# Patient Record
Sex: FEMALE | Race: WHITE | NOT HISPANIC OR LATINO | Employment: OTHER | URBAN - METROPOLITAN AREA
[De-identification: names, ages, dates, MRNs, and addresses within clinical notes are randomized per-mention and may not be internally consistent; named-entity substitution may affect disease eponyms.]

---

## 2023-04-26 LAB
ALANINE AMINOTRANSFERASE (SGPT) (U/L) IN SER/PLAS: 17 U/L (ref 7–45)
ALBUMIN (G/DL) IN SER/PLAS: 4.4 G/DL (ref 3.4–5)
ALKALINE PHOSPHATASE (U/L) IN SER/PLAS: 64 U/L (ref 33–136)
ANION GAP IN SER/PLAS: 11 MMOL/L (ref 10–20)
ASPARTATE AMINOTRANSFERASE (SGOT) (U/L) IN SER/PLAS: 17 U/L (ref 9–39)
BASOPHILS (10*3/UL) IN BLOOD BY AUTOMATED COUNT: 0.04 X10E9/L (ref 0–0.1)
BASOPHILS/100 LEUKOCYTES IN BLOOD BY AUTOMATED COUNT: 0.7 % (ref 0–2)
BILIRUBIN TOTAL (MG/DL) IN SER/PLAS: 0.4 MG/DL (ref 0–1.2)
C REACTIVE PROTEIN (MG/L) IN SER/PLAS: 0.43 MG/DL
CALCIUM (MG/DL) IN SER/PLAS: 9.6 MG/DL (ref 8.6–10.6)
CARBON DIOXIDE, TOTAL (MMOL/L) IN SER/PLAS: 29 MMOL/L (ref 21–32)
CHLORIDE (MMOL/L) IN SER/PLAS: 103 MMOL/L (ref 98–107)
CREATININE (MG/DL) IN SER/PLAS: 0.8 MG/DL (ref 0.5–1.05)
EOSINOPHILS (10*3/UL) IN BLOOD BY AUTOMATED COUNT: 0.1 X10E9/L (ref 0–0.4)
EOSINOPHILS/100 LEUKOCYTES IN BLOOD BY AUTOMATED COUNT: 1.8 % (ref 0–6)
ERYTHROCYTE DISTRIBUTION WIDTH (RATIO) BY AUTOMATED COUNT: 12.2 % (ref 11.5–14.5)
ERYTHROCYTE MEAN CORPUSCULAR HEMOGLOBIN CONCENTRATION (G/DL) BY AUTOMATED: 32.4 G/DL (ref 32–36)
ERYTHROCYTE MEAN CORPUSCULAR VOLUME (FL) BY AUTOMATED COUNT: 96 FL (ref 80–100)
ERYTHROCYTES (10*6/UL) IN BLOOD BY AUTOMATED COUNT: 4.4 X10E12/L (ref 4–5.2)
GFR FEMALE: 78 ML/MIN/1.73M2
GLUCOSE (MG/DL) IN SER/PLAS: 90 MG/DL (ref 74–99)
HEMATOCRIT (%) IN BLOOD BY AUTOMATED COUNT: 42.3 % (ref 36–46)
HEMOGLOBIN (G/DL) IN BLOOD: 13.7 G/DL (ref 12–16)
IMMATURE GRANULOCYTES/100 LEUKOCYTES IN BLOOD BY AUTOMATED COUNT: 0.2 % (ref 0–0.9)
LEUKOCYTES (10*3/UL) IN BLOOD BY AUTOMATED COUNT: 5.4 X10E9/L (ref 4.4–11.3)
LYMPHOCYTES (10*3/UL) IN BLOOD BY AUTOMATED COUNT: 2.13 X10E9/L (ref 0.8–3)
LYMPHOCYTES/100 LEUKOCYTES IN BLOOD BY AUTOMATED COUNT: 39.3 % (ref 13–44)
MONOCYTES (10*3/UL) IN BLOOD BY AUTOMATED COUNT: 0.48 X10E9/L (ref 0.05–0.8)
MONOCYTES/100 LEUKOCYTES IN BLOOD BY AUTOMATED COUNT: 8.9 % (ref 2–10)
NEUTROPHILS (10*3/UL) IN BLOOD BY AUTOMATED COUNT: 2.66 X10E9/L (ref 1.6–5.5)
NEUTROPHILS/100 LEUKOCYTES IN BLOOD BY AUTOMATED COUNT: 49.1 % (ref 40–80)
NRBC (PER 100 WBCS) BY AUTOMATED COUNT: 0 /100 WBC (ref 0–0)
PLATELETS (10*3/UL) IN BLOOD AUTOMATED COUNT: 294 X10E9/L (ref 150–450)
POTASSIUM (MMOL/L) IN SER/PLAS: 4.2 MMOL/L (ref 3.5–5.3)
PROTEIN TOTAL: 6.9 G/DL (ref 6.4–8.2)
SEDIMENTATION RATE, ERYTHROCYTE: 9 MM/H (ref 0–30)
SODIUM (MMOL/L) IN SER/PLAS: 139 MMOL/L (ref 136–145)
THYROTROPIN (MIU/L) IN SER/PLAS BY DETECTION LIMIT <= 0.05 MIU/L: 0.79 MIU/L (ref 0.44–3.98)
UREA NITROGEN (MG/DL) IN SER/PLAS: 18 MG/DL (ref 6–23)

## 2023-10-11 ENCOUNTER — TELEPHONE (OUTPATIENT)
Dept: PRIMARY CARE | Facility: CLINIC | Age: 73
End: 2023-10-11
Payer: COMMERCIAL

## 2023-10-11 DIAGNOSIS — M35.05 SJOGREN'S SYNDROME WITH INFLAMMATORY ARTHRITIS (MULTI): Primary | ICD-10-CM

## 2023-10-11 PROBLEM — E78.2 HYPERLIPIDEMIA, MIXED: Status: ACTIVE | Noted: 2023-10-11

## 2023-10-11 PROBLEM — M79.7 FIBROMYALGIA: Status: ACTIVE | Noted: 2023-10-11

## 2023-10-11 PROBLEM — M35.01 SJOGREN'S SYNDROME WITH KERATOCONJUNCTIVITIS SICCA (MULTI): Status: ACTIVE | Noted: 2023-10-11

## 2023-10-11 PROBLEM — F51.01 PRIMARY INSOMNIA: Status: ACTIVE | Noted: 2023-10-11

## 2023-10-11 PROBLEM — I10 HYPERTENSION, UNCONTROLLED: Status: ACTIVE | Noted: 2023-10-11

## 2023-10-11 PROBLEM — M21.959: Status: ACTIVE | Noted: 2023-10-11

## 2023-10-11 PROBLEM — F33.42 RECURRENT MAJOR DEPRESSION IN FULL REMISSION (CMS-HCC): Status: ACTIVE | Noted: 2023-10-11

## 2023-10-11 PROBLEM — K21.9 GERD (GASTROESOPHAGEAL REFLUX DISEASE): Status: ACTIVE | Noted: 2023-10-11

## 2023-10-11 PROBLEM — J31.0 CHRONIC SIMPLE RHINITIS: Status: ACTIVE | Noted: 2023-10-11

## 2023-10-11 RX ORDER — EZETIMIBE 10 MG/1
1 TABLET ORAL DAILY
COMMUNITY
Start: 2021-10-22 | End: 2024-03-11

## 2023-10-11 RX ORDER — FLUOXETINE HYDROCHLORIDE 20 MG/1
1 CAPSULE ORAL DAILY
COMMUNITY
Start: 2015-11-10 | End: 2024-03-11

## 2023-10-11 RX ORDER — LISINOPRIL 40 MG/1
1 TABLET ORAL DAILY
COMMUNITY
Start: 2019-05-01 | End: 2024-03-11

## 2023-10-11 RX ORDER — CHOLECALCIFEROL (VITAMIN D3) 50 MCG
TABLET ORAL
COMMUNITY
Start: 2021-03-24

## 2023-10-11 RX ORDER — AMLODIPINE BESYLATE 5 MG/1
1 TABLET ORAL DAILY
COMMUNITY
Start: 2022-04-20

## 2023-10-11 NOTE — PROGRESS NOTES
Subjective   Patient ID: Amelie Burris is a 73 y.o. female who presents for No chief complaint on file..  HPI    Review of Systems    Objective   Physical Exam    Assessment/Plan

## 2023-10-12 DIAGNOSIS — E78.2 HYPERLIPIDEMIA, MIXED: Primary | ICD-10-CM

## 2023-10-19 ENCOUNTER — LAB (OUTPATIENT)
Dept: LAB | Facility: LAB | Age: 73
End: 2023-10-19
Payer: COMMERCIAL

## 2023-10-19 DIAGNOSIS — M35.05 SJOGREN'S SYNDROME WITH INFLAMMATORY ARTHRITIS (MULTI): ICD-10-CM

## 2023-10-19 DIAGNOSIS — E78.2 HYPERLIPIDEMIA, MIXED: ICD-10-CM

## 2023-10-19 LAB
ALBUMIN SERPL BCP-MCNC: 4.5 G/DL (ref 3.4–5)
ALP SERPL-CCNC: 70 U/L (ref 33–136)
ALT SERPL W P-5'-P-CCNC: 20 U/L (ref 7–45)
ANION GAP SERPL CALC-SCNC: 14 MMOL/L (ref 10–20)
AST SERPL W P-5'-P-CCNC: 19 U/L (ref 9–39)
BASOPHILS # BLD AUTO: 0.04 X10*3/UL (ref 0–0.1)
BASOPHILS NFR BLD AUTO: 0.6 %
BILIRUB SERPL-MCNC: 0.4 MG/DL (ref 0–1.2)
BUN SERPL-MCNC: 14 MG/DL (ref 6–23)
CALCIUM SERPL-MCNC: 9.3 MG/DL (ref 8.6–10.6)
CHLORIDE SERPL-SCNC: 105 MMOL/L (ref 98–107)
CHOLEST SERPL-MCNC: 262 MG/DL (ref 0–199)
CHOLESTEROL/HDL RATIO: 6.9
CO2 SERPL-SCNC: 27 MMOL/L (ref 21–32)
CREAT SERPL-MCNC: 0.83 MG/DL (ref 0.5–1.05)
CRP SERPL-MCNC: 0.33 MG/DL
EOSINOPHIL # BLD AUTO: 0.17 X10*3/UL (ref 0–0.4)
EOSINOPHIL NFR BLD AUTO: 2.7 %
ERYTHROCYTE [DISTWIDTH] IN BLOOD BY AUTOMATED COUNT: 11.9 % (ref 11.5–14.5)
ERYTHROCYTE [SEDIMENTATION RATE] IN BLOOD BY WESTERGREN METHOD: 9 MM/H (ref 0–30)
GFR SERPL CREATININE-BSD FRML MDRD: 75 ML/MIN/1.73M*2
GLUCOSE SERPL-MCNC: 91 MG/DL (ref 74–99)
HCT VFR BLD AUTO: 41.4 % (ref 36–46)
HDLC SERPL-MCNC: 38 MG/DL
HGB BLD-MCNC: 13.8 G/DL (ref 12–16)
IMM GRANULOCYTES # BLD AUTO: 0.03 X10*3/UL (ref 0–0.5)
IMM GRANULOCYTES NFR BLD AUTO: 0.5 % (ref 0–0.9)
LDLC SERPL CALC-MCNC: 185 MG/DL
LYMPHOCYTES # BLD AUTO: 3.18 X10*3/UL (ref 0.8–3)
LYMPHOCYTES NFR BLD AUTO: 49.8 %
MCH RBC QN AUTO: 31.7 PG (ref 26–34)
MCHC RBC AUTO-ENTMCNC: 33.3 G/DL (ref 32–36)
MCV RBC AUTO: 95 FL (ref 80–100)
MONOCYTES # BLD AUTO: 0.56 X10*3/UL (ref 0.05–0.8)
MONOCYTES NFR BLD AUTO: 8.8 %
NEUTROPHILS # BLD AUTO: 2.41 X10*3/UL (ref 1.6–5.5)
NEUTROPHILS NFR BLD AUTO: 37.6 %
NON HDL CHOLESTEROL: 224 MG/DL (ref 0–149)
NRBC BLD-RTO: 0 /100 WBCS (ref 0–0)
PLATELET # BLD AUTO: 309 X10*3/UL (ref 150–450)
PMV BLD AUTO: 9.7 FL (ref 7.5–11.5)
POTASSIUM SERPL-SCNC: 4 MMOL/L (ref 3.5–5.3)
PROT SERPL-MCNC: 6.8 G/DL (ref 6.4–8.2)
RBC # BLD AUTO: 4.36 X10*6/UL (ref 4–5.2)
SODIUM SERPL-SCNC: 142 MMOL/L (ref 136–145)
TRIGL SERPL-MCNC: 195 MG/DL (ref 0–149)
VLDL: 39 MG/DL (ref 0–40)
WBC # BLD AUTO: 6.4 X10*3/UL (ref 4.4–11.3)

## 2023-10-19 PROCEDURE — 80053 COMPREHEN METABOLIC PANEL: CPT

## 2023-10-19 PROCEDURE — 85025 COMPLETE CBC W/AUTO DIFF WBC: CPT

## 2023-10-19 PROCEDURE — 85652 RBC SED RATE AUTOMATED: CPT

## 2023-10-19 PROCEDURE — 86140 C-REACTIVE PROTEIN: CPT

## 2023-10-19 PROCEDURE — 80061 LIPID PANEL: CPT

## 2023-10-19 PROCEDURE — 86038 ANTINUCLEAR ANTIBODIES: CPT

## 2023-10-19 PROCEDURE — 36415 COLL VENOUS BLD VENIPUNCTURE: CPT

## 2023-10-20 LAB — ANA SER QL HEP2 SUBST: NEGATIVE

## 2023-10-23 PROBLEM — H25.12 SENILE NUCLEAR CATARACT, LEFT: Status: ACTIVE | Noted: 2018-01-03

## 2023-10-23 PROBLEM — K52.9 CHRONIC DIARRHEA: Status: ACTIVE | Noted: 2023-10-23

## 2023-10-23 PROBLEM — Z90.13 HISTORY OF BILATERAL MASTECTOMY: Status: ACTIVE | Noted: 2023-10-23

## 2023-10-23 NOTE — PROGRESS NOTES
Subjective   Patient ID: Amelie Burris is a 73 y.o. female who presents for Sjogren's Syndrome.  HPI pt  notes increased fatigue.  Not sleeping well and OTC meds not really working.   Having itching and some dryness  Pt also notes a breast lump---she has had bilateral mastectomy  Pt is worried  Patient Active Problem List    Diagnosis Date Noted    History of bilateral mastectomy 10/23/2023    Chronic diarrhea 10/23/2023    Chondral defect of acetabulum 10/11/2023    Chronic simple rhinitis 10/11/2023    Fibromyalgia 10/11/2023    GERD (gastroesophageal reflux disease) 10/11/2023    Hyperlipidemia, mixed 10/11/2023    Hypertension, uncontrolled 10/11/2023    Primary insomnia 10/11/2023    Recurrent major depression in full remission (CMS/[facility], located [facility]) 10/11/2023    Sjogren's syndrome with inflammatory arthritis (CMS/[facility]) 10/11/2023    Senile nuclear cataract, left 01/03/2018    Fuchs' corneal dystrophy 06/04/2012     Past Medical History:   Diagnosis Date    Acne 10/24/2014    History of acne    Breast cancer (CMS/[facility]) 04/13/2020    History of breast cancer    Chronic maxillary sinusitis 11/09/2017    Right maxillary sinusitis    Drug rash 08/27/2015    History of drug rash    Dry mouth, unspecified 06/29/2015    Dry mouth    Facial dermatitis 10/11/2017    Facial dermatitis    Fuchs' corneal dystrophy 10/24/2014    Fuchs' corneal dystrophy    Nontoxic multinodular goiter     Multinodular goiter    Other long term (current) drug therapy 06/08/2016    Long-term use of Plaquenil    Plantar fascial fibromatosis 03/24/2021    Plantar fasciitis    Sensorineural hearing loss, bilateral     Sensorineural hearing loss (SNHL) of both ears    Thyroid nodule 06/08/2016    History of thyroid nodule    Tinnitus 10/24/2014    History of tinnitus    Unspecified cataract     Cataracts, bilateral     Current Outpatient Medications   Medication Instructions    amLODIPine (Norvasc) 5 mg tablet 1 tablet, oral, Daily    cholecalciferol  "(Vitamin D-3) 50 MCG (2000 UT) tablet oral    ezetimibe (Zetia) 10 mg tablet 1 tablet, oral, Daily    FLUoxetine (PROzac) 20 mg capsule 1 capsule, oral, Daily    lisinopril 40 mg tablet 1 tablet, oral, Daily    ramelteon (ROZEREM) 8 mg, oral, Nightly    white petrolatum-mineral oiL (Tears Naturale PM) 94-3 % ophthalmic ointment 1 Application, Both Eyes     Allergies   Allergen Reactions    Amoxicillin Nausea/vomiting       Review of Systems   Constitutional:  Positive for fatigue. Negative for fever and unexpected weight change.   HENT:  Negative for congestion.         Dry mouth   Respiratory:  Negative for cough and shortness of breath.    Cardiovascular:  Negative for leg swelling.   Musculoskeletal:  Positive for arthralgias. Negative for back pain.   Skin:  Negative for color change and rash.        +itching   Neurological:  Negative for dizziness, weakness, numbness and headaches.   Hematological:  Does not bruise/bleed easily.   Psychiatric/Behavioral:  Positive for sleep disturbance.        Objective  /86   Pulse 66   Temp 36.1 °C (97 °F)   Ht 1.664 m (5' 5.5\")   Wt 75 kg (165 lb 6.4 oz)   BMI 27.11 kg/m²     Physical Exam  Vitals reviewed.   Constitutional:       General: She is not in acute distress.     Appearance: Normal appearance.      Comments: Looks tired   HENT:      Head: Normocephalic and atraumatic.   Eyes:      Conjunctiva/sclera: Conjunctivae normal.   Pulmonary:      Effort: Pulmonary effort is normal.   Chest:          Comments: Small oblong hard nodule in upper inner quadrant.  About 2 centimeters lateral to a biopsy scar.     Pt s/p b mastectomy  Musculoskeletal:         General: No swelling, tenderness or deformity.      Right lower leg: No edema.      Left lower leg: No edema.   Skin:     Findings: No erythema or rash.   Neurological:      General: No focal deficit present.      Mental Status: She is alert.   Psychiatric:         Mood and Affect: Mood normal.       Lab on " 10/19/2023   Component Date Value Ref Range Status    WBC 10/19/2023 6.4  4.4 - 11.3 x10*3/uL Final    nRBC 10/19/2023 0.0  0.0 - 0.0 /100 WBCs Final    RBC 10/19/2023 4.36  4.00 - 5.20 x10*6/uL Final    Hemoglobin 10/19/2023 13.8  12.0 - 16.0 g/dL Final    Hematocrit 10/19/2023 41.4  36.0 - 46.0 % Final    MCV 10/19/2023 95  80 - 100 fL Final    MCH 10/19/2023 31.7  26.0 - 34.0 pg Final    MCHC 10/19/2023 33.3  32.0 - 36.0 g/dL Final    RDW 10/19/2023 11.9  11.5 - 14.5 % Final    Platelets 10/19/2023 309  150 - 450 x10*3/uL Final    MPV 10/19/2023 9.7  7.5 - 11.5 fL Final    Neutrophils % 10/19/2023 37.6  40.0 - 80.0 % Final    Immature Granulocytes %, Automated 10/19/2023 0.5  0.0 - 0.9 % Final    Immature Granulocyte Count (IG) includes promyelocytes, myelocytes and metamyelocytes but does not include bands. Percent differential counts (%) should be interpreted in the context of the absolute cell counts (cells/UL).    Lymphocytes % 10/19/2023 49.8  13.0 - 44.0 % Final    Monocytes % 10/19/2023 8.8  2.0 - 10.0 % Final    Eosinophils % 10/19/2023 2.7  0.0 - 6.0 % Final    Basophils % 10/19/2023 0.6  0.0 - 2.0 % Final    Neutrophils Absolute 10/19/2023 2.41  1.60 - 5.50 x10*3/uL Final    Percent differential counts (%) should be interpreted in the context of the absolute cell counts (cells/uL).    Immature Granulocytes Absolute, Au* 10/19/2023 0.03  0.00 - 0.50 x10*3/uL Final    Lymphocytes Absolute 10/19/2023 3.18 (H)  0.80 - 3.00 x10*3/uL Final    Monocytes Absolute 10/19/2023 0.56  0.05 - 0.80 x10*3/uL Final    Eosinophils Absolute 10/19/2023 0.17  0.00 - 0.40 x10*3/uL Final    Basophils Absolute 10/19/2023 0.04  0.00 - 0.10 x10*3/uL Final    Glucose 10/19/2023 91  74 - 99 mg/dL Final    Sodium 10/19/2023 142  136 - 145 mmol/L Final    Potassium 10/19/2023 4.0  3.5 - 5.3 mmol/L Final    Chloride 10/19/2023 105  98 - 107 mmol/L Final    Bicarbonate 10/19/2023 27  21 - 32 mmol/L Final    Anion Gap 10/19/2023 14  10  - 20 mmol/L Final    Urea Nitrogen 10/19/2023 14  6 - 23 mg/dL Final    Creatinine 10/19/2023 0.83  0.50 - 1.05 mg/dL Final    eGFR 10/19/2023 75  >60 mL/min/1.73m*2 Final    Calculations of estimated GFR are performed using the 2021 CKD-EPI Study Refit equation without the race variable for the IDMS-Traceable creatinine methods.  https://jasn.asnjournals.org/content/early/2021/09/22/ASN.6237424707    Calcium 10/19/2023 9.3  8.6 - 10.6 mg/dL Final    Albumin 10/19/2023 4.5  3.4 - 5.0 g/dL Final    Alkaline Phosphatase 10/19/2023 70  33 - 136 U/L Final    Total Protein 10/19/2023 6.8  6.4 - 8.2 g/dL Final    AST 10/19/2023 19  9 - 39 U/L Final    Bilirubin, Total 10/19/2023 0.4  0.0 - 1.2 mg/dL Final    ALT 10/19/2023 20  7 - 45 U/L Final    Patients treated with Sulfasalazine may generate falsely decreased results for ALT.    C-Reactive Protein 10/19/2023 0.33  <1.00 mg/dL Final    Sedimentation Rate 10/19/2023 9  0 - 30 mm/h Final    DMITRIY 10/19/2023 Negative  Negative Final    The Antinuclear Antibody (DMITRIY) test was performed using  indirect immunofluorescence assay with HEp-2 cells slide.    Cholesterol 10/19/2023 262 (H)  0 - 199 mg/dL Final          Age      Desirable   Borderline High   High     0-19 Y     0 - 169       170 - 199     >/= 200    20-24 Y     0 - 189       190 - 224     >/= 225         >24 Y     0 - 199       200 - 239     >/= 240   **All ranges are based on fasting samples. Specific   therapeutic targets will vary based on patient-specific   cardiac risk.    Pediatric guidelines reference:Pediatrics 2011, 128(S5).Adult guidelines reference: NCEP ATPIII Guidelines,SAAD 2001, 258:2486-97    Venipuncture immediately after or during the administration of Metamizole may lead to falsely low results. Testing should be performed immediately prior to Metamizole dosing.    HDL-Cholesterol 10/19/2023 38.0  mg/dL Final      Age       Very Low   Low     Normal    High    0-19 Y    < 35      < 40     40-45      ----  20-24 Y    ----     < 40      >45      ----        >24 Y      ----     < 40     40-60      >60      Cholesterol/HDL Ratio 10/19/2023 6.9   Final      Ref Values  Desirable  < 3.4  High Risk  > 5.0    LDL Calculated 10/19/2023 185 (H)  <=99 mg/dL Final                                Near   Borderline      AGE      Desirable  Optimal    High     High     Very High     0-19 Y     0 - 109     ---    110-129   >/= 130     ----    20-24 Y     0 - 119     ---    120-159   >/= 160     ----      >24 Y     0 -  99   100-129  130-159   160-189     >/=190      VLDL 10/19/2023 39  0 - 40 mg/dL Final    Triglycerides 10/19/2023 195 (H)  0 - 149 mg/dL Final       Age         Desirable   Borderline High   High     Very High   0 D-90 D    19 - 174         ----         ----        ----  91 D- 9 Y     0 -  74        75 -  99     >/= 100      ----    10-19 Y     0 -  89        90 - 129     >/= 130      ----    20-24 Y     0 - 114       115 - 149     >/= 150      ----         >24 Y     0 - 149       150 - 199    200- 499    >/= 500    Venipuncture immediately after or during the administration of Metamizole may lead to falsely low results. Testing should be performed immediately prior to Metamizole dosing.    Non HDL Cholesterol 10/19/2023 224 (H)  0 - 149 mg/dL Final          Age       Desirable   Borderline High   High     Very High     0-19 Y     0 - 119       120 - 144     >/= 145    >/= 160    20-24 Y     0 - 149       150 - 189     >/= 190      ----         >24 Y    30 mg/dL above LDL Cholesterol goal           Assessment/Plan   Problem List Items Addressed This Visit             ICD-10-CM    Fibromyalgia M79.7    Primary insomnia F51.01     Most sleep aids will worsen dryness.  Will try rozerem         Relevant Medications    ramelteon (Rozerem) 8 mg tablet    Sjogren's syndrome with inflammatory arthritis (CMS/HCC) - Primary M35.05     Sjogren's with dryness.  Pt's labs are stable and reviewed with patient.             History of bilateral mastectomy Z90.13     +nodule noted.   May be a surgical clip but recommend ultrasound to evaluate further          Other Visit Diagnoses         Codes    History of breast cancer     Z85.3    Relevant Orders    BI US breast complete left

## 2023-10-25 ENCOUNTER — OFFICE VISIT (OUTPATIENT)
Dept: RHEUMATOLOGY | Facility: CLINIC | Age: 73
End: 2023-10-25
Payer: COMMERCIAL

## 2023-10-25 ENCOUNTER — OFFICE VISIT (OUTPATIENT)
Dept: PRIMARY CARE | Facility: CLINIC | Age: 73
End: 2023-10-25
Payer: COMMERCIAL

## 2023-10-25 ENCOUNTER — TELEPHONE (OUTPATIENT)
Dept: PRIMARY CARE | Facility: CLINIC | Age: 73
End: 2023-10-25

## 2023-10-25 VITALS
TEMPERATURE: 97 F | WEIGHT: 165 LBS | SYSTOLIC BLOOD PRESSURE: 167 MMHG | HEART RATE: 63 BPM | DIASTOLIC BLOOD PRESSURE: 86 MMHG | HEIGHT: 66 IN | OXYGEN SATURATION: 92 % | BODY MASS INDEX: 26.52 KG/M2

## 2023-10-25 VITALS
SYSTOLIC BLOOD PRESSURE: 167 MMHG | BODY MASS INDEX: 26.58 KG/M2 | HEIGHT: 66 IN | HEART RATE: 66 BPM | DIASTOLIC BLOOD PRESSURE: 86 MMHG | TEMPERATURE: 97 F | WEIGHT: 165.4 LBS

## 2023-10-25 DIAGNOSIS — M79.7 FIBROMYALGIA: ICD-10-CM

## 2023-10-25 DIAGNOSIS — Z90.13 HISTORY OF BILATERAL MASTECTOMY: ICD-10-CM

## 2023-10-25 DIAGNOSIS — I10 BENIGN ESSENTIAL HYPERTENSION: ICD-10-CM

## 2023-10-25 DIAGNOSIS — Z13.6 SCREENING FOR CARDIOVASCULAR CONDITION: ICD-10-CM

## 2023-10-25 DIAGNOSIS — F51.01 PRIMARY INSOMNIA: ICD-10-CM

## 2023-10-25 DIAGNOSIS — M35.05 SJOGREN'S SYNDROME WITH INFLAMMATORY ARTHRITIS (MULTI): Primary | ICD-10-CM

## 2023-10-25 DIAGNOSIS — Z85.3 HISTORY OF BREAST CANCER: ICD-10-CM

## 2023-10-25 DIAGNOSIS — E78.2 HYPERLIPIDEMIA, MIXED: Primary | ICD-10-CM

## 2023-10-25 PROCEDURE — 1159F MED LIST DOCD IN RCRD: CPT | Performed by: INTERNAL MEDICINE

## 2023-10-25 PROCEDURE — 1036F TOBACCO NON-USER: CPT | Performed by: INTERNAL MEDICINE

## 2023-10-25 PROCEDURE — 1160F RVW MEDS BY RX/DR IN RCRD: CPT | Performed by: INTERNAL MEDICINE

## 2023-10-25 PROCEDURE — 1125F AMNT PAIN NOTED PAIN PRSNT: CPT | Performed by: INTERNAL MEDICINE

## 2023-10-25 PROCEDURE — 3077F SYST BP >= 140 MM HG: CPT | Performed by: INTERNAL MEDICINE

## 2023-10-25 PROCEDURE — 3079F DIAST BP 80-89 MM HG: CPT | Performed by: INTERNAL MEDICINE

## 2023-10-25 PROCEDURE — 99214 OFFICE O/P EST MOD 30 MIN: CPT | Performed by: INTERNAL MEDICINE

## 2023-10-25 PROCEDURE — 99213 OFFICE O/P EST LOW 20 MIN: CPT | Performed by: INTERNAL MEDICINE

## 2023-10-25 RX ORDER — RAMELTEON 8 MG/1
8 TABLET ORAL NIGHTLY
Qty: 30 TABLET | Refills: 11 | Status: SHIPPED | OUTPATIENT
Start: 2023-10-25 | End: 2024-04-25 | Stop reason: ALTCHOICE

## 2023-10-25 ASSESSMENT — ENCOUNTER SYMPTOMS
WEAKNESS: 0
FEVER: 0
DIZZINESS: 0
FATIGUE: 1
NUMBNESS: 0
PALPITATIONS: 0
ARTHRALGIAS: 1
ROS SKIN COMMENTS: +ITCHING
COLOR CHANGE: 0
CHILLS: 0
ARTHRALGIAS: 1
UNEXPECTED WEIGHT CHANGE: 0
COUGH: 0
FATIGUE: 1
SHORTNESS OF BREATH: 0
SLEEP DISTURBANCE: 1
BRUISES/BLEEDS EASILY: 0
SLEEP DISTURBANCE: 1
HEADACHES: 0
FEVER: 0
BACK PAIN: 0
COUGH: 0
SHORTNESS OF BREATH: 0

## 2023-10-25 ASSESSMENT — PATIENT HEALTH QUESTIONNAIRE - PHQ9
1. LITTLE INTEREST OR PLEASURE IN DOING THINGS: NOT AT ALL
2. FEELING DOWN, DEPRESSED OR HOPELESS: NOT AT ALL
SUM OF ALL RESPONSES TO PHQ9 QUESTIONS 1 AND 2: 0

## 2023-10-25 NOTE — PATIENT INSTRUCTIONS
It was a pleasure to see you today  Please call if your symptoms worsen  Please review your summary for education and reminders.  Follow up at your next appointment.    If you had labs/xrays done today, you will be able to view on [place].   We will contact you when the results are reviewed for further discussion.  Please note that you may receive your results before I have had a chance to review.  Please know I will be contacting you for discussion  Homegoing instructions for all patient  A healthy lifestyle helps chronic diseases  These are all the goals you should strive to improve your overall health   Blood pressure <130/85   BMI of <30 or waist circumference that is 1/2 of your height   Fasting blood sugar <107 (if you are diabetic, aim for an A1c <6.4%_   LDL cholesterol <130   Avoid smoking   Manage your stress   Get your preventive exams   Get your immunizations

## 2023-10-25 NOTE — LETTER
October 25, 2023     Diamante Payan MD  [address]  [address]  [facility] [address]    Patient: Amelie Burris   YOB: 1950   Date of Visit: 10/25/2023       Dear Dr. Diamante Payan MD:    Thank you for referring Amelie Burris to me for evaluation. Below are my notes for this consultation.  If you have questions, please do not hesitate to call me. I look forward to following your patient along with you.       Sincerely,     Deb Iqbal MD      CC: No Recipients  ______________________________________________________________________________________    Subjective  Patient ID: Amelie Burris is a 73 y.o. female who presents for Sjogren's Syndrome.  HPI pt  notes increased fatigue.  Not sleeping well and OTC meds not really working.   Having itching and some dryness  Pt also notes a breast lump---she has had bilateral mastectomy  Pt is worried  Patient Active Problem List    Diagnosis Date Noted   • History of bilateral mastectomy 10/23/2023   • Chronic diarrhea 10/23/2023   • Chondral defect of acetabulum 10/11/2023   • Chronic simple rhinitis 10/11/2023   • Fibromyalgia 10/11/2023   • GERD (gastroesophageal reflux disease) 10/11/2023   • Hyperlipidemia, mixed 10/11/2023   • Hypertension, uncontrolled 10/11/2023   • Primary insomnia 10/11/2023   • Recurrent major depression in full remission (CMS/HCC) 10/11/2023   • Sjogren's syndrome with inflammatory arthritis (CMS/[facility]) 10/11/2023   • Senile nuclear cataract, left 01/03/2018   • Fuchs' corneal dystrophy 06/04/2012     Past Medical History:   Diagnosis Date   • Acne 10/24/2014    History of acne   • Breast cancer (CMS/HCC) 04/13/2020    History of breast cancer   • Chronic maxillary sinusitis 11/09/2017    Right maxillary sinusitis   • Drug rash 08/27/2015    History of drug rash   • Dry mouth, unspecified 06/29/2015    Dry mouth   • Facial dermatitis 10/11/2017    Facial dermatitis   • Fuchs' corneal dystrophy 10/24/2014     "Fuchs' corneal dystrophy   • Nontoxic multinodular goiter     Multinodular goiter   • Other long term (current) drug therapy 06/08/2016    Long-term use of Plaquenil   • Plantar fascial fibromatosis 03/24/2021    Plantar fasciitis   • Sensorineural hearing loss, bilateral     Sensorineural hearing loss (SNHL) of both ears   • Thyroid nodule 06/08/2016    History of thyroid nodule   • Tinnitus 10/24/2014    History of tinnitus   • Unspecified cataract     Cataracts, bilateral     Current Outpatient Medications   Medication Instructions   • amLODIPine (Norvasc) 5 mg tablet 1 tablet, oral, Daily   • cholecalciferol (Vitamin D-3) 50 MCG (2000 UT) tablet oral   • ezetimibe (Zetia) 10 mg tablet 1 tablet, oral, Daily   • FLUoxetine (PROzac) 20 mg capsule 1 capsule, oral, Daily   • lisinopril 40 mg tablet 1 tablet, oral, Daily   • ramelteon (ROZEREM) 8 mg, oral, Nightly   • white petrolatum-mineral oiL (Tears Naturale PM) 94-3 % ophthalmic ointment 1 Application, Both Eyes     Allergies   Allergen Reactions   • Amoxicillin Nausea/vomiting       Review of Systems   Constitutional:  Positive for fatigue. Negative for fever and unexpected weight change.   HENT:  Negative for congestion.         Dry mouth   Respiratory:  Negative for cough and shortness of breath.    Cardiovascular:  Negative for leg swelling.   Musculoskeletal:  Positive for arthralgias. Negative for back pain.   Skin:  Negative for color change and rash.        +itching   Neurological:  Negative for dizziness, weakness, numbness and headaches.   Hematological:  Does not bruise/bleed easily.   Psychiatric/Behavioral:  Positive for sleep disturbance.        Objective /86   Pulse 66   Temp 36.1 °C (97 °F)   Ht 1.664 m (5' 5.5\")   Wt 75 kg (165 lb 6.4 oz)   BMI 27.11 kg/m²     Physical Exam  Vitals reviewed.   Constitutional:       General: She is not in acute distress.     Appearance: Normal appearance.      Comments: Looks tired   HENT:      Head: " Normocephalic and atraumatic.   Eyes:      Conjunctiva/sclera: Conjunctivae normal.   Pulmonary:      Effort: Pulmonary effort is normal.   Chest:          Comments: Small oblong hard nodule in upper inner quadrant.  About 2 centimeters lateral to a biopsy scar.     Pt s/p b mastectomy  Musculoskeletal:         General: No swelling, tenderness or deformity.      Right lower leg: No edema.      Left lower leg: No edema.   Skin:     Findings: No erythema or rash.   Neurological:      General: No focal deficit present.      Mental Status: She is alert.   Psychiatric:         Mood and Affect: Mood normal.       Lab on 10/19/2023   Component Date Value Ref Range Status   • WBC 10/19/2023 6.4  4.4 - 11.3 x10*3/uL Final   • nRBC 10/19/2023 0.0  0.0 - 0.0 /100 WBCs Final   • RBC 10/19/2023 4.36  4.00 - 5.20 x10*6/uL Final   • Hemoglobin 10/19/2023 13.8  12.0 - 16.0 g/dL Final   • Hematocrit 10/19/2023 41.4  36.0 - 46.0 % Final   • MCV 10/19/2023 95  80 - 100 fL Final   • MCH 10/19/2023 31.7  26.0 - 34.0 pg Final   • MCHC 10/19/2023 33.3  32.0 - 36.0 g/dL Final   • RDW 10/19/2023 11.9  11.5 - 14.5 % Final   • Platelets 10/19/2023 309  150 - 450 x10*3/uL Final   • MPV 10/19/2023 9.7  7.5 - 11.5 fL Final   • Neutrophils % 10/19/2023 37.6  40.0 - 80.0 % Final   • Immature Granulocytes %, Automated 10/19/2023 0.5  0.0 - 0.9 % Final    Immature Granulocyte Count (IG) includes promyelocytes, myelocytes and metamyelocytes but does not include bands. Percent differential counts (%) should be interpreted in the context of the absolute cell counts (cells/UL).   • Lymphocytes % 10/19/2023 49.8  13.0 - 44.0 % Final   • Monocytes % 10/19/2023 8.8  2.0 - 10.0 % Final   • Eosinophils % 10/19/2023 2.7  0.0 - 6.0 % Final   • Basophils % 10/19/2023 0.6  0.0 - 2.0 % Final   • Neutrophils Absolute 10/19/2023 2.41  1.60 - 5.50 x10*3/uL Final    Percent differential counts (%) should be interpreted in the context of the absolute cell counts  (cells/uL).   • Immature Granulocytes Absolute, Au* 10/19/2023 0.03  0.00 - 0.50 x10*3/uL Final   • Lymphocytes Absolute 10/19/2023 3.18 (H)  0.80 - 3.00 x10*3/uL Final   • Monocytes Absolute 10/19/2023 0.56  0.05 - 0.80 x10*3/uL Final   • Eosinophils Absolute 10/19/2023 0.17  0.00 - 0.40 x10*3/uL Final   • Basophils Absolute 10/19/2023 0.04  0.00 - 0.10 x10*3/uL Final   • Glucose 10/19/2023 91  74 - 99 mg/dL Final   • Sodium 10/19/2023 142  136 - 145 mmol/L Final   • Potassium 10/19/2023 4.0  3.5 - 5.3 mmol/L Final   • Chloride 10/19/2023 105  98 - 107 mmol/L Final   • Bicarbonate 10/19/2023 27  21 - 32 mmol/L Final   • Anion Gap 10/19/2023 14  10 - 20 mmol/L Final   • Urea Nitrogen 10/19/2023 14  6 - 23 mg/dL Final   • Creatinine 10/19/2023 0.83  0.50 - 1.05 mg/dL Final   • eGFR 10/19/2023 75  >60 mL/min/1.73m*2 Final    Calculations of estimated GFR are performed using the 2021 CKD-EPI Study Refit equation without the race variable for the IDMS-Traceable creatinine methods.  https://jasn.asnjournals.org/content/early/2021/09/22/ASN.1118268983   • Calcium 10/19/2023 9.3  8.6 - 10.6 mg/dL Final   • Albumin 10/19/2023 4.5  3.4 - 5.0 g/dL Final   • Alkaline Phosphatase 10/19/2023 70  33 - 136 U/L Final   • Total Protein 10/19/2023 6.8  6.4 - 8.2 g/dL Final   • AST 10/19/2023 19  9 - 39 U/L Final   • Bilirubin, Total 10/19/2023 0.4  0.0 - 1.2 mg/dL Final   • ALT 10/19/2023 20  7 - 45 U/L Final    Patients treated with Sulfasalazine may generate falsely decreased results for ALT.   • C-Reactive Protein 10/19/2023 0.33  <1.00 mg/dL Final   • Sedimentation Rate 10/19/2023 9  0 - 30 mm/h Final   • DMITRIY 10/19/2023 Negative  Negative Final    The Antinuclear Antibody (DMITRIY) test was performed using  indirect immunofluorescence assay with HEp-2 cells slide.   • Cholesterol 10/19/2023 262 (H)  0 - 199 mg/dL Final          Age      Desirable   Borderline High   High     0-19 Y     0 - 169       170 - 199     >/= 200    20-24 Y      0 - 189       190 - 224     >/= 225         >24 Y     0 - 199       200 - 239     >/= 240   **All ranges are based on fasting samples. Specific   therapeutic targets will vary based on patient-specific   cardiac risk.    Pediatric guidelines reference:Pediatrics 2011, 128(S5).Adult guidelines reference: NCEP ATPIII Guidelines,SAAD 2001, 258:2486-97    Venipuncture immediately after or during the administration of Metamizole may lead to falsely low results. Testing should be performed immediately prior to Metamizole dosing.   • HDL-Cholesterol 10/19/2023 38.0  mg/dL Final      Age       Very Low   Low     Normal    High    0-19 Y    < 35      < 40     40-45     ----  20-24 Y    ----     < 40      >45      ----        >24 Y      ----     < 40     40-60      >60     • Cholesterol/HDL Ratio 10/19/2023 6.9   Final      Ref Values  Desirable  < 3.4  High Risk  > 5.0   • LDL Calculated 10/19/2023 185 (H)  <=99 mg/dL Final                                Near   Borderline      AGE      Desirable  Optimal    High     High     Very High     0-19 Y     0 - 109     ---    110-129   >/= 130     ----    20-24 Y     0 - 119     ---    120-159   >/= 160     ----      >24 Y     0 -  99   100-129  130-159   160-189     >/=190     • VLDL 10/19/2023 39  0 - 40 mg/dL Final   • Triglycerides 10/19/2023 195 (H)  0 - 149 mg/dL Final       Age         Desirable   Borderline High   High     Very High   0 D-90 D    19 - 174         ----         ----        ----  91 D- 9 Y     0 -  74        75 -  99     >/= 100      ----    10-19 Y     0 -  89        90 - 129     >/= 130      ----    20-24 Y     0 - 114       115 - 149     >/= 150      ----         >24 Y     0 - 149       150 - 199    200- 499    >/= 500    Venipuncture immediately after or during the administration of Metamizole may lead to falsely low results. Testing should be performed immediately prior to Metamizole dosing.   • Non HDL Cholesterol 10/19/2023 224 (H)  0 - 149 mg/dL Final           Age       Desirable   Borderline High   High     Very High     0-19 Y     0 - 119       120 - 144     >/= 145    >/= 160    20-24 Y     0 - 149       150 - 189     >/= 190      ----         >24 Y    30 mg/dL above LDL Cholesterol goal           Assessment/Plan  Problem List Items Addressed This Visit             ICD-10-CM    Fibromyalgia M79.7    Primary insomnia F51.01     Most sleep aids will worsen dryness.  Will try rozerem         Relevant Medications    ramelteon (Rozerem) 8 mg tablet    Sjogren's syndrome with inflammatory arthritis (CMS/HCC) - Primary M35.05     Sjogren's with dryness.  Pt's labs are stable and reviewed with patient.            History of bilateral mastectomy Z90.13     +nodule noted.   May be a surgical clip but recommend ultrasound to evaluate further          Other Visit Diagnoses         Codes    History of breast cancer     Z85.3    Relevant Orders    BI US breast complete left

## 2023-10-25 NOTE — PROGRESS NOTES
"Subjective   Patient ID: Amelie Burris is a 73 y.o. female who presents for Follow-up.    HPI     Patient has been checking BP regularly at home and is usually < 140/90.  She has several numbers at goal.  Her home log sheet was copied to be scanned into her chart.  Not sleeping well - Dr. Iqbal prescribed Rozerem.  She also noticed a lump in her left chest wall recently - Dr. Iqbal ordered an ultrasound.  Labs done prior to appointment.  Cholesterol is improved, however still quite elevated.  She has wanted to stay off statin because she states she knows several people who developed diabetes after starting statin.    Review of Systems   Constitutional:  Positive for fatigue. Negative for chills and fever.   Respiratory:  Negative for cough and shortness of breath.    Cardiovascular:  Negative for chest pain, palpitations and leg swelling.   Musculoskeletal:  Positive for arthralgias.   Psychiatric/Behavioral:  Positive for sleep disturbance.        Objective   /86   Pulse 63   Temp 36.1 °C (97 °F) (Temporal)   Ht 1.664 m (5' 5.5\")   Wt 74.8 kg (165 lb)   SpO2 92%   BMI 27.04 kg/m²     Physical Exam  Constitutional:       Appearance: Normal appearance.   HENT:      Head: Normocephalic and atraumatic.   Cardiovascular:      Rate and Rhythm: Normal rate and regular rhythm.      Pulses: Normal pulses.   Pulmonary:      Effort: Pulmonary effort is normal. No respiratory distress.      Breath sounds: Normal breath sounds. No wheezing.   Chest:      Comments: Round nontender nodule, left chest wall (s/p bilateral mastectomy)  Abdominal:      General: There is no distension.      Palpations: Abdomen is soft.      Tenderness: There is no abdominal tenderness.   Musculoskeletal:      Right lower leg: No edema.      Left lower leg: No edema.   Skin:     Findings: No rash.   Neurological:      General: No focal deficit present.      Mental Status: She is alert and oriented to person, place, and time. " Mental status is at baseline.   Psychiatric:         Mood and Affect: Mood normal.         Behavior: Behavior normal.         Thought Content: Thought content normal.         Judgment: Judgment normal.         Assessment/Plan   Problem List Items Addressed This Visit             ICD-10-CM    Hyperlipidemia, mixed - Primary E78.2    Benign essential hypertension I10     Other Visit Diagnoses         Codes    Screening for cardiovascular condition     Z13.6    Relevant Orders    CT cardiac scoring wo IV contrast          Hypertension - high today, however has been controlled per home monitoring.  Continue healthy habits and home monitoring.    Mixed hyperlipidemia - improvement with Zetia, however still high.  CT Calcium score ordered to help with additional risk stratification and to recommend intensity of therapy.    I agree with US to assess chest wall nodule.    Follow-up in 6 months and PRN.

## 2023-11-01 ENCOUNTER — APPOINTMENT (OUTPATIENT)
Dept: RADIOLOGY | Facility: CLINIC | Age: 73
End: 2023-11-01
Payer: COMMERCIAL

## 2023-11-03 ENCOUNTER — HOSPITAL ENCOUNTER (OUTPATIENT)
Dept: RADIOLOGY | Facility: HOSPITAL | Age: 73
Discharge: HOME | End: 2023-11-03
Payer: COMMERCIAL

## 2023-11-03 DIAGNOSIS — Z90.13 HISTORY OF BILATERAL MASTECTOMY: ICD-10-CM

## 2023-11-03 DIAGNOSIS — Z85.3 HISTORY OF BREAST CANCER: ICD-10-CM

## 2023-11-03 DIAGNOSIS — N60.02 CYST OF LEFT BREAST: Primary | ICD-10-CM

## 2023-11-03 PROCEDURE — 76642 ULTRASOUND BREAST LIMITED: CPT | Mod: LT

## 2023-11-03 PROCEDURE — 76642 ULTRASOUND BREAST LIMITED: CPT | Mod: LEFT SIDE | Performed by: RADIOLOGY

## 2024-01-26 DIAGNOSIS — M35.05 SJOGREN'S SYNDROME WITH INFLAMMATORY ARTHRITIS (MULTI): Primary | ICD-10-CM

## 2024-03-11 DIAGNOSIS — I10 BENIGN ESSENTIAL HYPERTENSION: Primary | ICD-10-CM

## 2024-03-11 DIAGNOSIS — E78.2 HYPERLIPIDEMIA, MIXED: ICD-10-CM

## 2024-03-11 DIAGNOSIS — F33.42 RECURRENT MAJOR DEPRESSION IN FULL REMISSION (CMS-HCC): ICD-10-CM

## 2024-03-11 RX ORDER — EZETIMIBE 10 MG/1
10 TABLET ORAL DAILY
Qty: 90 TABLET | Refills: 3 | Status: SHIPPED | OUTPATIENT
Start: 2024-03-11

## 2024-03-11 RX ORDER — LISINOPRIL 40 MG/1
40 TABLET ORAL DAILY
Qty: 90 TABLET | Refills: 3 | Status: SHIPPED | OUTPATIENT
Start: 2024-03-11

## 2024-03-11 RX ORDER — FLUOXETINE HYDROCHLORIDE 20 MG/1
20 CAPSULE ORAL DAILY
Qty: 90 CAPSULE | Refills: 3 | Status: SHIPPED | OUTPATIENT
Start: 2024-03-11

## 2024-04-25 ENCOUNTER — OFFICE VISIT (OUTPATIENT)
Dept: PRIMARY CARE | Facility: CLINIC | Age: 74
End: 2024-04-25
Payer: COMMERCIAL

## 2024-04-25 ENCOUNTER — OFFICE VISIT (OUTPATIENT)
Dept: RHEUMATOLOGY | Facility: CLINIC | Age: 74
End: 2024-04-25
Payer: COMMERCIAL

## 2024-04-25 VITALS
HEART RATE: 70 BPM | OXYGEN SATURATION: 99 % | TEMPERATURE: 98 F | SYSTOLIC BLOOD PRESSURE: 145 MMHG | HEIGHT: 66 IN | BODY MASS INDEX: 26 KG/M2 | WEIGHT: 161.8 LBS | DIASTOLIC BLOOD PRESSURE: 82 MMHG

## 2024-04-25 VITALS
DIASTOLIC BLOOD PRESSURE: 82 MMHG | HEART RATE: 70 BPM | OXYGEN SATURATION: 99 % | SYSTOLIC BLOOD PRESSURE: 145 MMHG | TEMPERATURE: 98 F | WEIGHT: 161.3 LBS | HEIGHT: 66 IN | BODY MASS INDEX: 25.92 KG/M2

## 2024-04-25 DIAGNOSIS — H60.542 DERMATITIS OF LEFT EAR CANAL: ICD-10-CM

## 2024-04-25 DIAGNOSIS — H18.512 FUCHS' CORNEAL DYSTROPHY OF LEFT EYE: Primary | ICD-10-CM

## 2024-04-25 DIAGNOSIS — E78.2 HYPERLIPIDEMIA, MIXED: ICD-10-CM

## 2024-04-25 DIAGNOSIS — M79.7 FIBROMYALGIA: ICD-10-CM

## 2024-04-25 DIAGNOSIS — Z90.13 HISTORY OF BILATERAL MASTECTOMY: ICD-10-CM

## 2024-04-25 DIAGNOSIS — Z00.00 MEDICARE ANNUAL WELLNESS VISIT, SUBSEQUENT: Primary | ICD-10-CM

## 2024-04-25 DIAGNOSIS — H25.12 SENILE NUCLEAR CATARACT, LEFT: ICD-10-CM

## 2024-04-25 DIAGNOSIS — I10 BENIGN ESSENTIAL HYPERTENSION: ICD-10-CM

## 2024-04-25 DIAGNOSIS — M35.05 SJOGREN'S SYNDROME WITH INFLAMMATORY ARTHRITIS (MULTI): ICD-10-CM

## 2024-04-25 PROBLEM — H02.88B MEIBOMIAN GLAND DYSFUNCTION (MGD) OF UPPER AND LOWER LIDS OF BOTH EYES: Status: ACTIVE | Noted: 2024-04-25

## 2024-04-25 PROBLEM — M24.159 DEGENERATIVE TEAR OF ACETABULAR LABRUM: Status: ACTIVE | Noted: 2024-04-25

## 2024-04-25 PROBLEM — H02.403 PTOSIS OF BOTH EYELIDS: Status: ACTIVE | Noted: 2024-04-25

## 2024-04-25 PROBLEM — H02.88A MEIBOMIAN GLAND DYSFUNCTION (MGD) OF UPPER AND LOWER LIDS OF BOTH EYES: Status: ACTIVE | Noted: 2024-04-25

## 2024-04-25 PROBLEM — H60.549 DERMATITIS OF EAR CANAL: Status: ACTIVE | Noted: 2024-04-25

## 2024-04-25 PROCEDURE — 99214 OFFICE O/P EST MOD 30 MIN: CPT | Performed by: INTERNAL MEDICINE

## 2024-04-25 PROCEDURE — 1159F MED LIST DOCD IN RCRD: CPT | Performed by: INTERNAL MEDICINE

## 2024-04-25 PROCEDURE — 1158F ADVNC CARE PLAN TLK DOCD: CPT | Performed by: INTERNAL MEDICINE

## 2024-04-25 PROCEDURE — 1160F RVW MEDS BY RX/DR IN RCRD: CPT | Performed by: INTERNAL MEDICINE

## 2024-04-25 PROCEDURE — G0439 PPPS, SUBSEQ VISIT: HCPCS | Performed by: INTERNAL MEDICINE

## 2024-04-25 PROCEDURE — 3079F DIAST BP 80-89 MM HG: CPT | Performed by: INTERNAL MEDICINE

## 2024-04-25 PROCEDURE — 99213 OFFICE O/P EST LOW 20 MIN: CPT | Performed by: INTERNAL MEDICINE

## 2024-04-25 PROCEDURE — 1124F ACP DISCUSS-NO DSCNMKR DOCD: CPT | Performed by: INTERNAL MEDICINE

## 2024-04-25 PROCEDURE — 1036F TOBACCO NON-USER: CPT | Performed by: INTERNAL MEDICINE

## 2024-04-25 PROCEDURE — 3077F SYST BP >= 140 MM HG: CPT | Performed by: INTERNAL MEDICINE

## 2024-04-25 PROCEDURE — 1170F FXNL STATUS ASSESSED: CPT | Performed by: INTERNAL MEDICINE

## 2024-04-25 PROCEDURE — 1123F ACP DISCUSS/DSCN MKR DOCD: CPT | Performed by: INTERNAL MEDICINE

## 2024-04-25 RX ORDER — PERFLUOROHEXYLOCTANE 1 MG/MG
2 SOLUTION OPHTHALMIC DAILY
COMMUNITY
Start: 2024-03-20

## 2024-04-25 ASSESSMENT — PATIENT HEALTH QUESTIONNAIRE - PHQ9
SUM OF ALL RESPONSES TO PHQ9 QUESTIONS 1 & 2: 0
SUM OF ALL RESPONSES TO PHQ9 QUESTIONS 1 AND 2: 0
1. LITTLE INTEREST OR PLEASURE IN DOING THINGS: NOT AT ALL
1. LITTLE INTEREST OR PLEASURE IN DOING THINGS: NOT AT ALL
2. FEELING DOWN, DEPRESSED OR HOPELESS: NOT AT ALL
2. FEELING DOWN, DEPRESSED OR HOPELESS: NOT AT ALL

## 2024-04-25 ASSESSMENT — ENCOUNTER SYMPTOMS
DYSPHORIC MOOD: 0
MYALGIAS: 1
CHILLS: 0
ABDOMINAL PAIN: 0
DIARRHEA: 0
BACK PAIN: 0
FEVER: 0
FATIGUE: 1
MYALGIAS: 1
HEMATURIA: 0
COUGH: 0
SORE THROAT: 0
FATIGUE: 0
ARTHRALGIAS: 1
COLOR CHANGE: 0
NUMBNESS: 0
WEAKNESS: 0
ARTHRALGIAS: 1
PALPITATIONS: 0
VOMITING: 0
COUGH: 0
SHORTNESS OF BREATH: 0
DIZZINESS: 0
NAUSEA: 0
JOINT SWELLING: 0
FEVER: 0
EYE PAIN: 0
CONSTIPATION: 0
CONFUSION: 0
LIGHT-HEADEDNESS: 0
SHORTNESS OF BREATH: 0
HEADACHES: 0
DYSURIA: 0

## 2024-04-25 ASSESSMENT — ACTIVITIES OF DAILY LIVING (ADL)
GROCERY_SHOPPING: INDEPENDENT
DOING_HOUSEWORK: INDEPENDENT
TAKING_MEDICATION: INDEPENDENT
BATHING: INDEPENDENT
MANAGING_FINANCES: INDEPENDENT
DRESSING: INDEPENDENT

## 2024-04-25 NOTE — ASSESSMENT & PLAN NOTE
Persistent symptoms of fatigue which may be multifactorial.  Pt continues to see ophtho for Fuch's and dry eye.   Reviewed last labs with patient

## 2024-04-25 NOTE — PATIENT INSTRUCTIONS
It was a pleasure to see you today  Please call if your symptoms worsen  Please review your summary for education and reminders.  Follow up at your next appointment.    If you had labs/xrays done today, you will be able to view on [employer].   We will contact you when the results are reviewed for further discussion.  Please note that you may receive your results before I have had a chance to review.  Please know I will be contacting you for discussion  Homegoing instructions for all patient  A healthy lifestyle helps chronic diseases  These are all the goals you should strive to improve your overall health   Blood pressure <130/85   BMI of <30 or waist circumference that is 1/2 of your height   Fasting blood sugar <107 (if you are diabetic, aim for an A1c <6.4%_   LDL cholesterol <130   Avoid smoking   Manage your stress   Get your preventive exams   Get your immunizations   Sjogren's can cause severe dry eye--make sure you see your eye doctor on a regular basis  Sjogren's can cause severe dry mouth which increases the development of dental cavities.  Follow up with your dentist regularly

## 2024-04-25 NOTE — PROGRESS NOTES
RHEUMATOLOGY  PROGRESS NOTE  Amelie ALLAN Burris 74 y.o. female  Chief Complaint   Patient presents with    Follow-up    Sjogren's Syndrome       SUBJECTIVE  Pt reports worsening vision due to Fuch's dystrophy and development of a cataract.  Will need eventual surgery.   Vision is fluctuating.   Dry eye has also worsened.    Pt has overwhelming fatigue and some days, is unable to do any activities.   +diffuse achiness      Patient Active Problem List    Diagnosis Date Noted    Meibomian gland dysfunction (MGD) of upper and lower lids of both eyes 04/25/2024    Ptosis of both eyelids 04/25/2024    Degenerative tear of acetabular labrum 04/25/2024    Medicare annual wellness visit, subsequent 04/25/2024    History of bilateral mastectomy 10/23/2023    Chronic diarrhea 10/23/2023    Chondral defect of acetabulum 10/11/2023    Chronic simple rhinitis 10/11/2023    Fibromyalgia 10/11/2023    GERD (gastroesophageal reflux disease) 10/11/2023    Hyperlipidemia, mixed 10/11/2023    Benign essential hypertension 10/11/2023    Primary insomnia 10/11/2023    Recurrent major depression in full remission (CMS-HCC) 10/11/2023    Sjogren's syndrome with inflammatory arthritis (Multi) 10/11/2023    Senile nuclear cataract, left 01/03/2018    Fuchs' corneal dystrophy 06/04/2012     Past Medical History:   Diagnosis Date    Acne 10/24/2014    History of acne    Breast cancer (Multi) 04/13/2020    History of breast cancer    Chronic maxillary sinusitis 11/09/2017    Right maxillary sinusitis    Drug rash 08/27/2015    History of drug rash    Dry mouth, unspecified 06/29/2015    Dry mouth    Facial dermatitis 10/11/2017    Facial dermatitis    Fuchs' corneal dystrophy 10/24/2014    Fuchs' corneal dystrophy    Nontoxic multinodular goiter     Multinodular goiter    Plantar fascial fibromatosis 03/24/2021    Plantar fasciitis    Sensorineural hearing loss, bilateral     Sensorineural hearing loss (SNHL) of both ears    Thyroid nodule  "06/08/2016    History of thyroid nodule    Tinnitus 10/24/2014    History of tinnitus    Unspecified cataract     Cataracts, bilateral     Current Outpatient Medications   Medication Instructions    amLODIPine (Norvasc) 5 mg tablet 1 tablet, oral, Daily    cholecalciferol (Vitamin D-3) 50 MCG (2000 UT) tablet oral    ezetimibe (ZETIA) 10 mg, oral, Daily    FLUoxetine (PROZAC) 20 mg, oral, Daily    lisinopril 40 mg, oral, Daily    Miebo 100 % drops 2 drops, Both Eyes, Daily    povidone/PF (IVIZIA, PF, OPHT) 1 drop, ophthalmic (eye), Every 8 hours PRN     Allergies   Allergen Reactions    Amoxicillin Nausea/vomiting     Review of Systems   Constitutional:  Positive for fatigue. Negative for fever.   Eyes:  Positive for visual disturbance. Negative for pain.        Dry eye   Respiratory:  Negative for cough and shortness of breath.    Cardiovascular:  Negative for leg swelling.   Musculoskeletal:  Positive for arthralgias and myalgias. Negative for back pain, gait problem and joint swelling.   Skin:  Negative for color change and rash.   Neurological:  Negative for weakness and numbness.       PHYSICAL EXAM  /82   Pulse 70   Temp 36.7 °C (98 °F) (Temporal)   Ht 1.676 m (5' 6\")   Wt 73.2 kg (161 lb 4.8 oz)   SpO2 99%   BMI 26.03 kg/m²   Physical Exam  Vitals reviewed.   Constitutional:       General: She is not in acute distress.     Appearance: Normal appearance.   HENT:      Head: Normocephalic and atraumatic.   Eyes:      General: No scleral icterus.     Extraocular Movements: Extraocular movements intact.      Conjunctiva/sclera: Conjunctivae normal.      Pupils: Pupils are equal, round, and reactive to light.      Comments: Mild ptosis   Pulmonary:      Effort: Pulmonary effort is normal. No respiratory distress.   Musculoskeletal:         General: No swelling, tenderness or deformity.      Cervical back: Normal range of motion and neck supple. No tenderness.      Right lower leg: No edema.      Left " lower leg: No edema.      Comments: No synovitis of examined joints   Skin:     Coloration: Skin is not pale.      Findings: No erythema or rash.   Neurological:      General: No focal deficit present.      Mental Status: She is alert and oriented to person, place, and time. Mental status is at baseline.      Gait: Gait normal.   Psychiatric:         Mood and Affect: Mood normal.         Assessment/plan  Problem List Items Addressed This Visit       Fibromyalgia    Sjogren's syndrome with inflammatory arthritis (Multi)    Overview     Previous plaquenil  Saw ophtho Jan 2024 ([facility]le)--worsening dry eye.         Current Assessment & Plan     Persistent symptoms of fatigue which may be multifactorial.  Pt continues to see ophtho for Fuch's and dry eye.   Reviewed last labs with patient         Fuchs' corneal dystrophy - Primary    Overview     Sees Dr Santos.           Senile nuclear cataract, left     Follow up: ___6__months

## 2024-04-25 NOTE — PROGRESS NOTES
CHIEF COMPLAINT  Medicare Annual Wellness Visit Subsequent    HISTORY OF PRESENT ILLNESS  Amelie Burris is a 74 y.o. female presents today for follow up of Medicare Annual Wellness Visit Subsequent    HPI    Past Medical, Surgical, and Family History reviewed and updated in chart.  Reviewed all medications by prescribing practitioner or clinical pharmacist (such as prescriptions, OTCs, herbal therapies and supplements) and documented in the medical record.    Fuchs dystrophy has bene worsening over the last several months.  She has been seeing her ophthalmologist every week.  At one point, she had to put 19 drops in her eyes throughout the days.  Sometimes her eyes feel very uncomfortable.  She is waiting to hear back from them to schedule partial corneal transplant and also cataract surgery.    Right hip pain due to torn labrum.  She is able to do housework, yardwork, thus she is not interested in any kind of intervention at this time.    Declines colon cancer screening.  Not interested in immunizations.  Cholesterol is high - she is not interested in retesting today.     REVIEW OF SYSTEMS  Review of Systems   Constitutional:  Negative for chills, fatigue and fever.   HENT:  Negative for sore throat.    Eyes:         See HPI   Respiratory:  Negative for cough and shortness of breath.    Cardiovascular:  Negative for chest pain, palpitations and leg swelling.   Gastrointestinal:  Negative for abdominal pain, constipation, diarrhea, nausea and vomiting.   Genitourinary:  Negative for dysuria and hematuria.   Musculoskeletal:  Positive for arthralgias and myalgias.   Skin:  Negative for rash.   Neurological:  Negative for dizziness, light-headedness and headaches.   Psychiatric/Behavioral:  Negative for confusion and dysphoric mood.        ALLERGIES  Amoxicillin    MEDICATIONS  Current Outpatient Medications   Medication Instructions    amLODIPine (Norvasc) 5 mg tablet 1 tablet, oral, Daily    cholecalciferol  "(Vitamin D-3) 50 MCG (2000 UT) tablet oral    ezetimibe (ZETIA) 10 mg, oral, Daily    FLUoxetine (PROZAC) 20 mg, oral, Daily    lisinopril 40 mg, oral, Daily    Miebo 100 % drops 2 drops, Both Eyes, Daily    povidone/PF (IVIZIA, PF, OPHT) 1 drop, ophthalmic (eye), Every 8 hours PRN       TOBACCO USE  Social History     Tobacco Use   Smoking Status Never   Smokeless Tobacco Never       DEPRESSION SCREEN  Over the past 2 weeks, how often have you been bothered by any of the following problems?  Little interest or pleasure in doing things: Not at all  Feeling down, depressed, or hopeless: Not at all    SURGICAL HISTORY  Past Surgical History:  10/24/2014: BREAST SURGERY      Comment:  Breast Surgery Enlargement Procedure  10/24/2014: BREAST SURGERY      Comment:  Breast Surgery Removal Of Mammary Implant Bilateral  10/24/2014: CHOLECYSTECTOMY      Comment:  Cholecystectomy  10/24/2014: COLONOSCOPY      Comment:  Complete Colonoscopy  10/24/2014: DILATION AND CURETTAGE OF UTERUS      Comment:  Dilation And Curettage  No date: ESOPHAGOGASTRODUODENOSCOPY  10/24/2014: LIPOSUCTION      Comment:  Liposuction  10/24/2014: MASTECTOMY, RADICAL      Comment:  Modified Radical Mastectomy Bilateral  10/24/2014: SINUS SURGERY      Comment:  Sinus Surgery  12/21/2017: SINUS SURGERY      Comment:  Sinus Surgery  10/24/2014: TUBAL LIGATION      Comment:  Tubal Ligation       OBJECTIVE    /82   Pulse 70   Temp 36.7 °C (98 °F)   Ht 1.676 m (5' 6\")   Wt 73.4 kg (161 lb 12.8 oz)   SpO2 99%   BMI 26.12 kg/m²    BMI: Estimated body mass index is 26.12 kg/m² as calculated from the following:    Height as of this encounter: 1.676 m (5' 6\").    Weight as of this encounter: 73.4 kg (161 lb 12.8 oz).    BP Readings from Last 3 Encounters:   04/25/24 145/82   04/25/24 145/82   10/25/23 167/86      Wt Readings from Last 3 Encounters:   04/25/24 73.4 kg (161 lb 12.8 oz)   04/25/24 73.2 kg (161 lb 4.8 oz)   10/25/23 74.8 kg (165 lb)    "     PHYSICAL EXAM  Physical Exam  Constitutional:       Appearance: Normal appearance.   HENT:      Head: Normocephalic and atraumatic.      Ears:      Comments: Ear canals are narrow.  Mild dermatitis at entrance to left ear canal.   Cardiovascular:      Rate and Rhythm: Normal rate and regular rhythm.      Pulses: Normal pulses.   Pulmonary:      Effort: Pulmonary effort is normal. No respiratory distress.      Breath sounds: Normal breath sounds. No wheezing.   Abdominal:      General: There is no distension.      Palpations: Abdomen is soft.      Tenderness: There is no abdominal tenderness.   Musculoskeletal:      Right lower leg: No edema.      Left lower leg: No edema.   Skin:     Findings: No rash.   Neurological:      General: No focal deficit present.      Mental Status: She is alert and oriented to person, place, and time. Mental status is at baseline.   Psychiatric:         Mood and Affect: Mood normal.         Behavior: Behavior normal.         Thought Content: Thought content normal.         Judgment: Judgment normal.          ASSESSMENT AND PLAN  Assessment/Plan   Problem List Items Addressed This Visit       Hyperlipidemia, mixed    Benign essential hypertension    History of bilateral mastectomy    Medicare annual wellness visit, subsequent - Primary    Dermatitis of ear canal     Medicare Wellness Visit completed.     Hypertension - mildly elevated.  Continue current medication and healthy habits as able.    Hyperlipidemia - continue Zetia.    Mammogram no longer indicated.      Colon cancer screening declined.    Reviewed signs of skin cancer and importance of sun protection.    Stay up to date with dental exams.    Ok to use sweet oil or diluted tea tree oil in ear canals to help with dermatitis.    Immunizations declined.    Follow-up 6 months.

## 2024-08-30 ENCOUNTER — OFFICE VISIT (OUTPATIENT)
Dept: PRIMARY CARE | Facility: CLINIC | Age: 74
End: 2024-08-30
Payer: COMMERCIAL

## 2024-08-30 ENCOUNTER — LAB (OUTPATIENT)
Dept: LAB | Facility: LAB | Age: 74
End: 2024-08-30
Payer: COMMERCIAL

## 2024-08-30 VITALS
SYSTOLIC BLOOD PRESSURE: 179 MMHG | TEMPERATURE: 96.8 F | HEART RATE: 77 BPM | WEIGHT: 159.7 LBS | BODY MASS INDEX: 25.67 KG/M2 | DIASTOLIC BLOOD PRESSURE: 92 MMHG | HEIGHT: 66 IN | OXYGEN SATURATION: 98 %

## 2024-08-30 DIAGNOSIS — I10 BENIGN ESSENTIAL HYPERTENSION: ICD-10-CM

## 2024-08-30 DIAGNOSIS — R42 DIZZINESS: ICD-10-CM

## 2024-08-30 DIAGNOSIS — R11.2 NAUSEA AND VOMITING, UNSPECIFIED VOMITING TYPE: ICD-10-CM

## 2024-08-30 DIAGNOSIS — I10 HYPERTENSION, UNSPECIFIED TYPE: ICD-10-CM

## 2024-08-30 DIAGNOSIS — I10 HYPERTENSION, UNSPECIFIED TYPE: Primary | ICD-10-CM

## 2024-08-30 PROCEDURE — 3079F DIAST BP 80-89 MM HG: CPT | Performed by: NURSE PRACTITIONER

## 2024-08-30 PROCEDURE — 80053 COMPREHEN METABOLIC PANEL: CPT

## 2024-08-30 PROCEDURE — 1160F RVW MEDS BY RX/DR IN RCRD: CPT | Performed by: NURSE PRACTITIONER

## 2024-08-30 PROCEDURE — 85025 COMPLETE CBC W/AUTO DIFF WBC: CPT

## 2024-08-30 PROCEDURE — 36415 COLL VENOUS BLD VENIPUNCTURE: CPT

## 2024-08-30 PROCEDURE — 3008F BODY MASS INDEX DOCD: CPT | Performed by: NURSE PRACTITIONER

## 2024-08-30 PROCEDURE — 3077F SYST BP >= 140 MM HG: CPT | Performed by: NURSE PRACTITIONER

## 2024-08-30 PROCEDURE — 1159F MED LIST DOCD IN RCRD: CPT | Performed by: NURSE PRACTITIONER

## 2024-08-30 PROCEDURE — 1123F ACP DISCUSS/DSCN MKR DOCD: CPT | Performed by: NURSE PRACTITIONER

## 2024-08-30 PROCEDURE — 99214 OFFICE O/P EST MOD 30 MIN: CPT | Performed by: NURSE PRACTITIONER

## 2024-08-30 RX ORDER — AMLODIPINE BESYLATE 5 MG/1
5 TABLET ORAL DAILY
Qty: 30 TABLET | Refills: 1 | Status: SHIPPED | OUTPATIENT
Start: 2024-08-30 | End: 2024-10-29

## 2024-08-30 RX ORDER — ONDANSETRON 8 MG/1
8 TABLET, ORALLY DISINTEGRATING ORAL EVERY 8 HOURS PRN
Qty: 20 TABLET | Refills: 0 | Status: SHIPPED | OUTPATIENT
Start: 2024-08-30 | End: 2024-09-06

## 2024-08-30 ASSESSMENT — PATIENT HEALTH QUESTIONNAIRE - PHQ9
SUM OF ALL RESPONSES TO PHQ9 QUESTIONS 1 AND 2: 0
1. LITTLE INTEREST OR PLEASURE IN DOING THINGS: NOT AT ALL
2. FEELING DOWN, DEPRESSED OR HOPELESS: NOT AT ALL

## 2024-08-30 NOTE — PROGRESS NOTES
Chief Complaint  Vertigo (Saturday night laid down to put eye drops in, as soon as she laid down everything started spinning got clammy, cold sweat. Then she said she was going to get sick-vomiting and diarrhea. Shakey, was up all night couldn't sleep.  ).    History Of Present Illness  Amelie Burris is a 74 y.o. female presents today for evaluation of Vertigo (Saturday night laid down to put eye drops in, as soon as she laid down everything started spinning got clammy, cold sweat. Then she said she was going to get sick-vomiting and diarrhea. Shakey, was up all night couldn't sleep.  ).      Reportst that she had Eye procedure on 7/8/24 - corneal transplant and cataract surgery  7/29/24  rebubble  procedure completed  Saturday-  had ocular migraine all day.  At 8:30 pm laid down to put drops in - had severe vertigo.   Then became nauseated and clammy.   BP at that time SBP 140s (laying).  Became extremely nauseated and projectile vomited. Also had diarrhea at that time (states this is common has hx of IBS-D).    Sunday- Vertigo all day  Monday- slightly better   Tuesday - better was able to do some house work.  Tried vertigo exercises.   Wednesday/Thursday noted she could not read well.   Today-- went to see Ophthalmology due to continued decrease in vision. Was advised to increase eye drops       BP elevated today and on several recent visits.    Did take lisinopril at home today already.     Review of Systems  Review of Systems   Constitutional:  Negative for activity change and appetite change.   HENT:  Negative for congestion.    Eyes:  Negative for pain and itching.   Respiratory:  Negative for cough, shortness of breath and wheezing.    Cardiovascular:  Negative for chest pain, palpitations and leg swelling.   Gastrointestinal:  Positive for diarrhea (loose stools chronic). Negative for abdominal distention, abdominal pain, constipation and vomiting.   Genitourinary:  Negative for dysuria, frequency,  hematuria and urgency.   Musculoskeletal:  Negative for arthralgias and gait problem.   Skin:  Negative for rash and wound.   Neurological:  Positive for dizziness. Negative for seizures, syncope, facial asymmetry, speech difficulty and numbness.   Psychiatric/Behavioral:  The patient is not nervous/anxious.        Past Medical History  She has a past medical history of Acne (10/24/2014), Breast cancer (Multi) (04/13/2020), Chronic maxillary sinusitis (11/09/2017), Drug rash (08/27/2015), Dry mouth, unspecified (06/29/2015), Facial dermatitis (10/11/2017), Fuchs' corneal dystrophy (10/24/2014), Nontoxic multinodular goiter, Plantar fascial fibromatosis (03/24/2021), Sensorineural hearing loss, bilateral, Thyroid nodule (06/08/2016), Tinnitus (10/24/2014), and Unspecified cataract.    Surgical History  She has a past surgical history that includes Mastectomy, radical (10/24/2014); Breast surgery (10/24/2014); Breast surgery (10/24/2014); Cholecystectomy (10/24/2014); Dilation and curettage of uterus (10/24/2014); Tubal ligation (10/24/2014); Colonoscopy (10/24/2014); Liposuction (10/24/2014); Sinus surgery (10/24/2014); Sinus surgery (12/21/2017); and Esophagogastroduodenoscopy.    Family History  Family History   Problem Relation Name Age of Onset    Diabetes Mother      Breast cancer Mother      Gout Father      Heart failure Father      Heart attack Father      Kidney failure Father      Coronary artery disease Father      Hyperlipidemia Sister      Diabetes Sister      Arthritis Maternal Grandmother      Diabetes Maternal Grandfather          Social History  She reports that she has never smoked. She has never used smokeless tobacco. She reports that she does not currently use alcohol. She reports that she does not use drugs.    DEPRESSION SCREEN  Over the past 2 weeks, how often have you been bothered by any of the following problems?  Little interest or pleasure in doing things: Not at all  Feeling down,  "depressed, or hopeless: Not at all      Allergies  Amoxicillin    Medications  Current Outpatient Medications   Medication Instructions    amLODIPine (NORVASC) 5 mg, oral, Daily    cholecalciferol (Vitamin D-3) 50 MCG (2000 UT) tablet oral    ezetimibe (ZETIA) 10 mg, oral, Daily    FLUoxetine (PROZAC) 20 mg, oral, Daily    lisinopril 40 mg, oral, Daily    Miebo 100 % drops 2 drops, Both Eyes, Daily    povidone/PF (IVIZIA, PF, OPHT) 1 drop, ophthalmic (eye), Every 8 hours PRN        Objective   BP (!) 179/92   Pulse 77   Temp 36 °C (96.8 °F) (Temporal)   Ht 1.676 m (5' 6\")   Wt 72.4 kg (159 lb 11.2 oz)   SpO2 98%   BMI 25.78 kg/m²    BMI: Estimated body mass index is 25.78 kg/m² as calculated from the following:    Height as of this encounter: 1.676 m (5' 6\").    Weight as of this encounter: 72.4 kg (159 lb 11.2 oz).  This SmartLink has not been configured with any valid records.     BP Readings from Last 6 Encounters:   08/30/24 (!) 179/92   04/25/24 145/82   04/25/24 145/82   10/25/23 167/86   10/25/23 167/86   04/26/23 137/69       Physical Exam  Physical Exam  Vitals and nursing note reviewed.   Constitutional:       Appearance: Normal appearance.   HENT:      Head: Normocephalic and atraumatic.      Nose: Nose normal.      Mouth/Throat:      Mouth: Mucous membranes are moist.      Pharynx: Oropharynx is clear.   Eyes:      Extraocular Movements: Extraocular movements intact.      Conjunctiva/sclera: Conjunctivae normal.      Pupils: Pupils are equal, round, and reactive to light.   Cardiovascular:      Rate and Rhythm: Normal rate and regular rhythm.      Pulses: Normal pulses.      Heart sounds: Normal heart sounds.   Pulmonary:      Effort: Pulmonary effort is normal.      Breath sounds: Normal breath sounds.   Abdominal:      General: Bowel sounds are normal.      Palpations: Abdomen is soft.      Tenderness: There is no abdominal tenderness.   Musculoskeletal:         General: Normal range of motion. "      Cervical back: Neck supple.   Skin:     General: Skin is warm and dry.   Neurological:      General: No focal deficit present.      Mental Status: She is alert and oriented to person, place, and time. Mental status is at baseline.   Psychiatric:         Mood and Affect: Mood normal.         Behavior: Behavior normal.         Thought Content: Thought content normal.         Judgment: Judgment normal.         Relevant Results and Imaging  Lab on 08/30/2024   Component Date Value Ref Range Status    WBC 08/30/2024 6.6  4.4 - 11.3 x10*3/uL Final    nRBC 08/30/2024 0.0  0.0 - 0.0 /100 WBCs Final    RBC 08/30/2024 4.47  4.00 - 5.20 x10*6/uL Final    Hemoglobin 08/30/2024 13.7  12.0 - 16.0 g/dL Final    Hematocrit 08/30/2024 42.6  36.0 - 46.0 % Final    MCV 08/30/2024 95  80 - 100 fL Final    MCH 08/30/2024 30.6  26.0 - 34.0 pg Final    MCHC 08/30/2024 32.2  32.0 - 36.0 g/dL Final    RDW 08/30/2024 12.6  11.5 - 14.5 % Final    Platelets 08/30/2024 295  150 - 450 x10*3/uL Final    Neutrophils % 08/30/2024 50.2  40.0 - 80.0 % Final    Immature Granulocytes %, Automated 08/30/2024 0.3  0.0 - 0.9 % Final    Immature Granulocyte Count (IG) includes promyelocytes, myelocytes and metamyelocytes but does not include bands. Percent differential counts (%) should be interpreted in the context of the absolute cell counts (cells/UL).    Lymphocytes % 08/30/2024 38.9  13.0 - 44.0 % Final    Monocytes % 08/30/2024 8.4  2.0 - 10.0 % Final    Eosinophils % 08/30/2024 1.4  0.0 - 6.0 % Final    Basophils % 08/30/2024 0.8  0.0 - 2.0 % Final    Neutrophils Absolute 08/30/2024 3.30  1.60 - 5.50 x10*3/uL Final    Percent differential counts (%) should be interpreted in the context of the absolute cell counts (cells/uL).    Immature Granulocytes Absolute, Au* 08/30/2024 0.02  0.00 - 0.50 x10*3/uL Final    Lymphocytes Absolute 08/30/2024 2.55  0.80 - 3.00 x10*3/uL Final    Monocytes Absolute 08/30/2024 0.55  0.05 - 0.80 x10*3/uL Final     Eosinophils Absolute 08/30/2024 0.09  0.00 - 0.40 x10*3/uL Final    Basophils Absolute 08/30/2024 0.05  0.00 - 0.10 x10*3/uL Final    Glucose 08/30/2024 91  74 - 99 mg/dL Final    Sodium 08/30/2024 141  136 - 145 mmol/L Final    Potassium 08/30/2024 4.0  3.5 - 5.3 mmol/L Final    Chloride 08/30/2024 103  98 - 107 mmol/L Final    Bicarbonate 08/30/2024 27  21 - 32 mmol/L Final    Anion Gap 08/30/2024 15  10 - 20 mmol/L Final    Urea Nitrogen 08/30/2024 11  6 - 23 mg/dL Final    Creatinine 08/30/2024 0.84  0.50 - 1.05 mg/dL Final    eGFR 08/30/2024 73  >60 mL/min/1.73m*2 Final    Calculations of estimated GFR are performed using the 2021 CKD-EPI Study Refit equation without the race variable for the IDMS-Traceable creatinine methods.  https://jasn.asnjournals.org/content/early/2021/09/22/ASN.9867184293    Calcium 08/30/2024 9.4  8.6 - 10.6 mg/dL Final    Albumin 08/30/2024 4.4  3.4 - 5.0 g/dL Final    Alkaline Phosphatase 08/30/2024 64  33 - 136 U/L Final    Total Protein 08/30/2024 7.0  6.4 - 8.2 g/dL Final    AST 08/30/2024 19  9 - 39 U/L Final    Bilirubin, Total 08/30/2024 0.5  0.0 - 1.2 mg/dL Final    ALT 08/30/2024 20  7 - 45 U/L Final    Patients treated with Sulfasalazine may generate falsely decreased results for ALT.   Lab on 10/19/2023   Component Date Value Ref Range Status    WBC 10/19/2023 6.4  4.4 - 11.3 x10*3/uL Final    nRBC 10/19/2023 0.0  0.0 - 0.0 /100 WBCs Final    RBC 10/19/2023 4.36  4.00 - 5.20 x10*6/uL Final    Hemoglobin 10/19/2023 13.8  12.0 - 16.0 g/dL Final    Hematocrit 10/19/2023 41.4  36.0 - 46.0 % Final    MCV 10/19/2023 95  80 - 100 fL Final    MCH 10/19/2023 31.7  26.0 - 34.0 pg Final    MCHC 10/19/2023 33.3  32.0 - 36.0 g/dL Final    RDW 10/19/2023 11.9  11.5 - 14.5 % Final    Platelets 10/19/2023 309  150 - 450 x10*3/uL Final    MPV 10/19/2023 9.7  7.5 - 11.5 fL Final    Neutrophils % 10/19/2023 37.6  40.0 - 80.0 % Final    Immature Granulocytes %, Automated 10/19/2023 0.5  0.0 -  0.9 % Final    Immature Granulocyte Count (IG) includes promyelocytes, myelocytes and metamyelocytes but does not include bands. Percent differential counts (%) should be interpreted in the context of the absolute cell counts (cells/UL).    Lymphocytes % 10/19/2023 49.8  13.0 - 44.0 % Final    Monocytes % 10/19/2023 8.8  2.0 - 10.0 % Final    Eosinophils % 10/19/2023 2.7  0.0 - 6.0 % Final    Basophils % 10/19/2023 0.6  0.0 - 2.0 % Final    Neutrophils Absolute 10/19/2023 2.41  1.60 - 5.50 x10*3/uL Final    Percent differential counts (%) should be interpreted in the context of the absolute cell counts (cells/uL).    Immature Granulocytes Absolute, Au* 10/19/2023 0.03  0.00 - 0.50 x10*3/uL Final    Lymphocytes Absolute 10/19/2023 3.18 (H)  0.80 - 3.00 x10*3/uL Final    Monocytes Absolute 10/19/2023 0.56  0.05 - 0.80 x10*3/uL Final    Eosinophils Absolute 10/19/2023 0.17  0.00 - 0.40 x10*3/uL Final    Basophils Absolute 10/19/2023 0.04  0.00 - 0.10 x10*3/uL Final    Glucose 10/19/2023 91  74 - 99 mg/dL Final    Sodium 10/19/2023 142  136 - 145 mmol/L Final    Potassium 10/19/2023 4.0  3.5 - 5.3 mmol/L Final    Chloride 10/19/2023 105  98 - 107 mmol/L Final    Bicarbonate 10/19/2023 27  21 - 32 mmol/L Final    Anion Gap 10/19/2023 14  10 - 20 mmol/L Final    Urea Nitrogen 10/19/2023 14  6 - 23 mg/dL Final    Creatinine 10/19/2023 0.83  0.50 - 1.05 mg/dL Final    eGFR 10/19/2023 75  >60 mL/min/1.73m*2 Final    Calculations of estimated GFR are performed using the 2021 CKD-EPI Study Refit equation without the race variable for the IDMS-Traceable creatinine methods.  https://jasn.asnjournals.org/content/early/2021/09/22/ASN.3838547330    Calcium 10/19/2023 9.3  8.6 - 10.6 mg/dL Final    Albumin 10/19/2023 4.5  3.4 - 5.0 g/dL Final    Alkaline Phosphatase 10/19/2023 70  33 - 136 U/L Final    Total Protein 10/19/2023 6.8  6.4 - 8.2 g/dL Final    AST 10/19/2023 19  9 - 39 U/L Final    Bilirubin, Total 10/19/2023 0.4  0.0 -  1.2 mg/dL Final    ALT 10/19/2023 20  7 - 45 U/L Final    Patients treated with Sulfasalazine may generate falsely decreased results for ALT.    C-Reactive Protein 10/19/2023 0.33  <1.00 mg/dL Final    Sedimentation Rate 10/19/2023 9  0 - 30 mm/h Final    DMITRIY 10/19/2023 Negative  Negative Final    The Antinuclear Antibody (DMITRIY) test was performed using  indirect immunofluorescence assay with HEp-2 cells slide.    Cholesterol 10/19/2023 262 (H)  0 - 199 mg/dL Final          Age      Desirable   Borderline High   High     0-19 Y     0 - 169       170 - 199     >/= 200    20-24 Y     0 - 189       190 - 224     >/= 225         >24 Y     0 - 199       200 - 239     >/= 240   **All ranges are based on fasting samples. Specific   therapeutic targets will vary based on patient-specific   cardiac risk.    Pediatric guidelines reference:Pediatrics 2011, 128(S5).Adult guidelines reference: NCEP ATPIII Guidelines,SAAD 2001, 258:2486-97    Venipuncture immediately after or during the administration of Metamizole may lead to falsely low results. Testing should be performed immediately prior to Metamizole dosing.    HDL-Cholesterol 10/19/2023 38.0  mg/dL Final      Age       Very Low   Low     Normal    High    0-19 Y    < 35      < 40     40-45     ----  20-24 Y    ----     < 40      >45      ----        >24 Y      ----     < 40     40-60      >60      Cholesterol/HDL Ratio 10/19/2023 6.9   Final      Ref Values  Desirable  < 3.4  High Risk  > 5.0    LDL Calculated 10/19/2023 185 (H)  <=99 mg/dL Final                                Near   Borderline      AGE      Desirable  Optimal    High     High     Very High     0-19 Y     0 - 109     ---    110-129   >/= 130     ----    20-24 Y     0 - 119     ---    120-159   >/= 160     ----      >24 Y     0 -  99   100-129  130-159   160-189     >/=190      VLDL 10/19/2023 39  0 - 40 mg/dL Final    Triglycerides 10/19/2023 195 (H)  0 - 149 mg/dL Final       Age         Desirable    Borderline High   High     Very High   0 D-90 D    19 - 174         ----         ----        ----  91 D- 9 Y     0 -  74        75 -  99     >/= 100      ----    10-19 Y     0 -  89        90 - 129     >/= 130      ----    20-24 Y     0 - 114       115 - 149     >/= 150      ----         >24 Y     0 - 149       150 - 199    200- 499    >/= 500    Venipuncture immediately after or during the administration of Metamizole may lead to falsely low results. Testing should be performed immediately prior to Metamizole dosing.    Non HDL Cholesterol 10/19/2023 224 (H)  0 - 149 mg/dL Final          Age       Desirable   Borderline High   High     Very High     0-19 Y     0 - 119       120 - 144     >/= 145    >/= 160    20-24 Y     0 - 149       150 - 189     >/= 190      ----         >24 Y    30 mg/dL above LDL Cholesterol goal       No images are attached to the encounter.        Assessment and Plan  Assessment/Plan   Problem List Items Addressed This Visit             ICD-10-CM    Benign essential hypertension I10     BP elevated  Currently managed on lisinopril   Pt states BP has been elevated/uncontrolled   Start amlodipine  5mg daily-- follow up in 1 mo w PCP for continued BP management           Other Visit Diagnoses         Codes    Hypertension, unspecified type    -  Primary I10    Relevant Medications    amLODIPine (Norvasc) 5 mg tablet    Other Relevant Orders    CBC and Auto Differential (Completed)    Comprehensive Metabolic Panel (Completed)    Dizziness     R42    Relevant Orders    CBC and Auto Differential (Completed)    Comprehensive Metabolic Panel (Completed)    Nausea and vomiting, unspecified vomiting type     R11.2

## 2024-08-31 LAB
ALBUMIN SERPL BCP-MCNC: 4.4 G/DL (ref 3.4–5)
ALP SERPL-CCNC: 64 U/L (ref 33–136)
ALT SERPL W P-5'-P-CCNC: 20 U/L (ref 7–45)
ANION GAP SERPL CALC-SCNC: 15 MMOL/L (ref 10–20)
AST SERPL W P-5'-P-CCNC: 19 U/L (ref 9–39)
BASOPHILS # BLD AUTO: 0.05 X10*3/UL (ref 0–0.1)
BASOPHILS NFR BLD AUTO: 0.8 %
BILIRUB SERPL-MCNC: 0.5 MG/DL (ref 0–1.2)
BUN SERPL-MCNC: 11 MG/DL (ref 6–23)
CALCIUM SERPL-MCNC: 9.4 MG/DL (ref 8.6–10.6)
CHLORIDE SERPL-SCNC: 103 MMOL/L (ref 98–107)
CO2 SERPL-SCNC: 27 MMOL/L (ref 21–32)
CREAT SERPL-MCNC: 0.84 MG/DL (ref 0.5–1.05)
EGFRCR SERPLBLD CKD-EPI 2021: 73 ML/MIN/1.73M*2
EOSINOPHIL # BLD AUTO: 0.09 X10*3/UL (ref 0–0.4)
EOSINOPHIL NFR BLD AUTO: 1.4 %
ERYTHROCYTE [DISTWIDTH] IN BLOOD BY AUTOMATED COUNT: 12.6 % (ref 11.5–14.5)
GLUCOSE SERPL-MCNC: 91 MG/DL (ref 74–99)
HCT VFR BLD AUTO: 42.6 % (ref 36–46)
HGB BLD-MCNC: 13.7 G/DL (ref 12–16)
IMM GRANULOCYTES # BLD AUTO: 0.02 X10*3/UL (ref 0–0.5)
IMM GRANULOCYTES NFR BLD AUTO: 0.3 % (ref 0–0.9)
LYMPHOCYTES # BLD AUTO: 2.55 X10*3/UL (ref 0.8–3)
LYMPHOCYTES NFR BLD AUTO: 38.9 %
MCH RBC QN AUTO: 30.6 PG (ref 26–34)
MCHC RBC AUTO-ENTMCNC: 32.2 G/DL (ref 32–36)
MCV RBC AUTO: 95 FL (ref 80–100)
MONOCYTES # BLD AUTO: 0.55 X10*3/UL (ref 0.05–0.8)
MONOCYTES NFR BLD AUTO: 8.4 %
NEUTROPHILS # BLD AUTO: 3.3 X10*3/UL (ref 1.6–5.5)
NEUTROPHILS NFR BLD AUTO: 50.2 %
NRBC BLD-RTO: 0 /100 WBCS (ref 0–0)
PLATELET # BLD AUTO: 295 X10*3/UL (ref 150–450)
POTASSIUM SERPL-SCNC: 4 MMOL/L (ref 3.5–5.3)
PROT SERPL-MCNC: 7 G/DL (ref 6.4–8.2)
RBC # BLD AUTO: 4.47 X10*6/UL (ref 4–5.2)
SODIUM SERPL-SCNC: 141 MMOL/L (ref 136–145)
WBC # BLD AUTO: 6.6 X10*3/UL (ref 4.4–11.3)

## 2024-09-07 ASSESSMENT — ENCOUNTER SYMPTOMS
HEMATURIA: 0
EYE PAIN: 0
NERVOUS/ANXIOUS: 0
DIARRHEA: 1
ABDOMINAL PAIN: 0
SPEECH DIFFICULTY: 0
APPETITE CHANGE: 0
FREQUENCY: 0
COUGH: 0
NUMBNESS: 0
ABDOMINAL DISTENTION: 0
EYE ITCHING: 0
DYSURIA: 0
CONSTIPATION: 0
WOUND: 0
FACIAL ASYMMETRY: 0
ACTIVITY CHANGE: 0
DIZZINESS: 1
SHORTNESS OF BREATH: 0
WHEEZING: 0
ARTHRALGIAS: 0
PALPITATIONS: 0
SEIZURES: 0
VOMITING: 0

## 2024-09-08 NOTE — ASSESSMENT & PLAN NOTE
BP elevated  Currently managed on lisinopril   Pt states BP has been elevated/uncontrolled   Start amlodipine  5mg daily-- follow up in 1 mo w PCP for continued BP management

## 2024-09-10 ENCOUNTER — TELEPHONE (OUTPATIENT)
Dept: PRIMARY CARE | Facility: CLINIC | Age: 74
End: 2024-09-10
Payer: COMMERCIAL

## 2024-09-10 NOTE — TELEPHONE ENCOUNTER
Patient returned our call.  I read to patient Blanca's results message.  Patient cancelled her 9/30/24 appointment.  Patient states she has been checking her bp and it is normal.  Patient states she is keeping her appts in October with Ban aPyan and Rasheed.

## 2024-09-30 ENCOUNTER — APPOINTMENT (OUTPATIENT)
Dept: PRIMARY CARE | Facility: CLINIC | Age: 74
End: 2024-09-30
Payer: COMMERCIAL

## 2024-10-27 PROBLEM — Z00.00 MEDICARE ANNUAL WELLNESS VISIT, SUBSEQUENT: Status: RESOLVED | Noted: 2024-04-25 | Resolved: 2024-10-27

## 2024-10-30 ENCOUNTER — APPOINTMENT (OUTPATIENT)
Dept: RHEUMATOLOGY | Facility: CLINIC | Age: 74
End: 2024-10-30
Payer: COMMERCIAL

## 2024-10-30 ENCOUNTER — LAB (OUTPATIENT)
Dept: LAB | Facility: LAB | Age: 74
End: 2024-10-30
Payer: COMMERCIAL

## 2024-10-30 ENCOUNTER — APPOINTMENT (OUTPATIENT)
Dept: PRIMARY CARE | Facility: CLINIC | Age: 74
End: 2024-10-30
Payer: COMMERCIAL

## 2024-10-30 VITALS
DIASTOLIC BLOOD PRESSURE: 86 MMHG | BODY MASS INDEX: 25.88 KG/M2 | HEART RATE: 70 BPM | OXYGEN SATURATION: 98 % | WEIGHT: 161 LBS | TEMPERATURE: 96.6 F | SYSTOLIC BLOOD PRESSURE: 144 MMHG | HEIGHT: 66 IN

## 2024-10-30 DIAGNOSIS — Z90.13 HISTORY OF BILATERAL MASTECTOMY: Primary | ICD-10-CM

## 2024-10-30 DIAGNOSIS — M35.05 SJOGREN'S SYNDROME WITH INFLAMMATORY ARTHRITIS: ICD-10-CM

## 2024-10-30 DIAGNOSIS — F51.01 PRIMARY INSOMNIA: ICD-10-CM

## 2024-10-30 DIAGNOSIS — M35.05 SJOGREN'S SYNDROME WITH INFLAMMATORY ARTHRITIS: Primary | ICD-10-CM

## 2024-10-30 LAB
ALBUMIN SERPL BCP-MCNC: 4.4 G/DL (ref 3.4–5)
ALP SERPL-CCNC: 59 U/L (ref 33–136)
ALT SERPL W P-5'-P-CCNC: 25 U/L (ref 7–45)
ANION GAP SERPL CALC-SCNC: 15 MMOL/L (ref 10–20)
AST SERPL W P-5'-P-CCNC: 24 U/L (ref 9–39)
BASOPHILS # BLD AUTO: 0.03 X10*3/UL (ref 0–0.1)
BASOPHILS NFR BLD AUTO: 0.5 %
BILIRUB SERPL-MCNC: 0.5 MG/DL (ref 0–1.2)
BUN SERPL-MCNC: 13 MG/DL (ref 6–23)
CALCIUM SERPL-MCNC: 9.3 MG/DL (ref 8.6–10.6)
CHLORIDE SERPL-SCNC: 102 MMOL/L (ref 98–107)
CO2 SERPL-SCNC: 27 MMOL/L (ref 21–32)
CREAT SERPL-MCNC: 0.95 MG/DL (ref 0.5–1.05)
CRP SERPL-MCNC: 0.27 MG/DL
EGFRCR SERPLBLD CKD-EPI 2021: 63 ML/MIN/1.73M*2
EOSINOPHIL # BLD AUTO: 0.12 X10*3/UL (ref 0–0.4)
EOSINOPHIL NFR BLD AUTO: 1.8 %
ERYTHROCYTE [DISTWIDTH] IN BLOOD BY AUTOMATED COUNT: 11.9 % (ref 11.5–14.5)
ERYTHROCYTE [SEDIMENTATION RATE] IN BLOOD BY WESTERGREN METHOD: 11 MM/H (ref 0–30)
GLUCOSE SERPL-MCNC: 95 MG/DL (ref 74–99)
HCT VFR BLD AUTO: 42.5 % (ref 36–46)
HGB BLD-MCNC: 14.2 G/DL (ref 12–16)
IMM GRANULOCYTES # BLD AUTO: 0.02 X10*3/UL (ref 0–0.5)
IMM GRANULOCYTES NFR BLD AUTO: 0.3 % (ref 0–0.9)
LYMPHOCYTES # BLD AUTO: 2.66 X10*3/UL (ref 0.8–3)
LYMPHOCYTES NFR BLD AUTO: 40.4 %
MCH RBC QN AUTO: 31.7 PG (ref 26–34)
MCHC RBC AUTO-ENTMCNC: 33.4 G/DL (ref 32–36)
MCV RBC AUTO: 95 FL (ref 80–100)
MONOCYTES # BLD AUTO: 0.62 X10*3/UL (ref 0.05–0.8)
MONOCYTES NFR BLD AUTO: 9.4 %
NEUTROPHILS # BLD AUTO: 3.14 X10*3/UL (ref 1.6–5.5)
NEUTROPHILS NFR BLD AUTO: 47.6 %
NRBC BLD-RTO: 0 /100 WBCS (ref 0–0)
PLATELET # BLD AUTO: 293 X10*3/UL (ref 150–450)
POTASSIUM SERPL-SCNC: 4.7 MMOL/L (ref 3.5–5.3)
PROT SERPL-MCNC: 7.1 G/DL (ref 6.4–8.2)
RBC # BLD AUTO: 4.48 X10*6/UL (ref 4–5.2)
SODIUM SERPL-SCNC: 139 MMOL/L (ref 136–145)
WBC # BLD AUTO: 6.6 X10*3/UL (ref 4.4–11.3)

## 2024-10-30 PROCEDURE — 3077F SYST BP >= 140 MM HG: CPT | Performed by: INTERNAL MEDICINE

## 2024-10-30 PROCEDURE — 3008F BODY MASS INDEX DOCD: CPT | Performed by: INTERNAL MEDICINE

## 2024-10-30 PROCEDURE — 86038 ANTINUCLEAR ANTIBODIES: CPT

## 2024-10-30 PROCEDURE — 99214 OFFICE O/P EST MOD 30 MIN: CPT | Performed by: INTERNAL MEDICINE

## 2024-10-30 PROCEDURE — 85025 COMPLETE CBC W/AUTO DIFF WBC: CPT

## 2024-10-30 PROCEDURE — 1123F ACP DISCUSS/DSCN MKR DOCD: CPT | Performed by: INTERNAL MEDICINE

## 2024-10-30 PROCEDURE — 1160F RVW MEDS BY RX/DR IN RCRD: CPT | Performed by: INTERNAL MEDICINE

## 2024-10-30 PROCEDURE — 80053 COMPREHEN METABOLIC PANEL: CPT

## 2024-10-30 PROCEDURE — 85652 RBC SED RATE AUTOMATED: CPT

## 2024-10-30 PROCEDURE — 86140 C-REACTIVE PROTEIN: CPT

## 2024-10-30 PROCEDURE — 1159F MED LIST DOCD IN RCRD: CPT | Performed by: INTERNAL MEDICINE

## 2024-10-30 PROCEDURE — 1036F TOBACCO NON-USER: CPT | Performed by: INTERNAL MEDICINE

## 2024-10-30 PROCEDURE — 3079F DIAST BP 80-89 MM HG: CPT | Performed by: INTERNAL MEDICINE

## 2024-10-30 RX ORDER — AMITRIPTYLINE HYDROCHLORIDE 10 MG/1
10 TABLET, FILM COATED ORAL NIGHTLY
Qty: 30 TABLET | Refills: 2 | Status: SHIPPED | OUTPATIENT
Start: 2024-10-30 | End: 2025-01-28

## 2024-10-30 RX ORDER — PREDNISOLONE ACETATE 10 MG/ML
1 SUSPENSION/ DROPS OPHTHALMIC 4 TIMES DAILY
COMMUNITY
End: 2024-10-30 | Stop reason: WASHOUT

## 2024-10-30 ASSESSMENT — ENCOUNTER SYMPTOMS
NUMBNESS: 0
SHORTNESS OF BREATH: 0
BACK PAIN: 0
MYALGIAS: 0
EYE REDNESS: 1
COUGH: 0
SLEEP DISTURBANCE: 1
JOINT SWELLING: 0
ARTHRALGIAS: 1
WEAKNESS: 0
FATIGUE: 1
COLOR CHANGE: 0
FEVER: 0

## 2024-10-30 ASSESSMENT — PATIENT HEALTH QUESTIONNAIRE - PHQ9
SUM OF ALL RESPONSES TO PHQ9 QUESTIONS 1 AND 2: 0
2. FEELING DOWN, DEPRESSED OR HOPELESS: NOT AT ALL
1. LITTLE INTEREST OR PLEASURE IN DOING THINGS: NOT AT ALL

## 2024-10-31 LAB — ANA SER QL HEP2 SUBST: NEGATIVE

## 2024-11-12 ENCOUNTER — APPOINTMENT (OUTPATIENT)
Dept: PRIMARY CARE | Facility: CLINIC | Age: 74
End: 2024-11-12
Payer: COMMERCIAL

## 2025-04-27 PROBLEM — H60.549 DERMATITIS OF EAR CANAL: Status: RESOLVED | Noted: 2024-04-25 | Resolved: 2025-04-27

## 2025-04-30 ENCOUNTER — APPOINTMENT (OUTPATIENT)
Dept: PRIMARY CARE | Facility: CLINIC | Age: 75
End: 2025-04-30
Payer: COMMERCIAL

## 2025-04-30 ENCOUNTER — APPOINTMENT (OUTPATIENT)
Dept: RHEUMATOLOGY | Facility: CLINIC | Age: 75
End: 2025-04-30
Payer: COMMERCIAL

## 2025-04-30 VITALS
WEIGHT: 163.9 LBS | HEART RATE: 61 BPM | OXYGEN SATURATION: 96 % | BODY MASS INDEX: 26.34 KG/M2 | HEIGHT: 66 IN | TEMPERATURE: 96.8 F | SYSTOLIC BLOOD PRESSURE: 174 MMHG | DIASTOLIC BLOOD PRESSURE: 82 MMHG

## 2025-04-30 VITALS
SYSTOLIC BLOOD PRESSURE: 188 MMHG | TEMPERATURE: 96.8 F | WEIGHT: 163.4 LBS | HEART RATE: 61 BPM | HEIGHT: 66 IN | DIASTOLIC BLOOD PRESSURE: 72 MMHG | OXYGEN SATURATION: 96 % | BODY MASS INDEX: 26.26 KG/M2

## 2025-04-30 DIAGNOSIS — M79.7 FIBROMYALGIA: ICD-10-CM

## 2025-04-30 DIAGNOSIS — F33.42 RECURRENT MAJOR DEPRESSION IN FULL REMISSION (CMS-HCC): Chronic | ICD-10-CM

## 2025-04-30 DIAGNOSIS — E78.2 HYPERLIPIDEMIA, MIXED: Chronic | ICD-10-CM

## 2025-04-30 DIAGNOSIS — Z00.00 MEDICARE ANNUAL WELLNESS VISIT, SUBSEQUENT: Primary | ICD-10-CM

## 2025-04-30 DIAGNOSIS — I10 BENIGN ESSENTIAL HYPERTENSION: Chronic | ICD-10-CM

## 2025-04-30 DIAGNOSIS — M35.05 SJOGREN'S SYNDROME WITH INFLAMMATORY ARTHRITIS: Primary | ICD-10-CM

## 2025-04-30 DIAGNOSIS — Z90.13 HISTORY OF BILATERAL MASTECTOMY: ICD-10-CM

## 2025-04-30 DIAGNOSIS — Z13.89 SCREENING FOR MULTIPLE CONDITIONS: ICD-10-CM

## 2025-04-30 PROBLEM — Z94.7 HISTORY OF CORNEAL TRANSPLANT: Status: ACTIVE | Noted: 2025-04-30

## 2025-04-30 PROCEDURE — 3079F DIAST BP 80-89 MM HG: CPT | Performed by: INTERNAL MEDICINE

## 2025-04-30 PROCEDURE — 1159F MED LIST DOCD IN RCRD: CPT | Performed by: INTERNAL MEDICINE

## 2025-04-30 PROCEDURE — 1123F ACP DISCUSS/DSCN MKR DOCD: CPT | Performed by: INTERNAL MEDICINE

## 2025-04-30 PROCEDURE — 1170F FXNL STATUS ASSESSED: CPT | Performed by: INTERNAL MEDICINE

## 2025-04-30 PROCEDURE — 3077F SYST BP >= 140 MM HG: CPT | Performed by: INTERNAL MEDICINE

## 2025-04-30 PROCEDURE — 3078F DIAST BP <80 MM HG: CPT | Performed by: INTERNAL MEDICINE

## 2025-04-30 PROCEDURE — 1036F TOBACCO NON-USER: CPT | Performed by: INTERNAL MEDICINE

## 2025-04-30 PROCEDURE — G0439 PPPS, SUBSEQ VISIT: HCPCS | Performed by: INTERNAL MEDICINE

## 2025-04-30 PROCEDURE — 1160F RVW MEDS BY RX/DR IN RCRD: CPT | Performed by: INTERNAL MEDICINE

## 2025-04-30 PROCEDURE — 1158F ADVNC CARE PLAN TLK DOCD: CPT | Performed by: INTERNAL MEDICINE

## 2025-04-30 PROCEDURE — 99214 OFFICE O/P EST MOD 30 MIN: CPT | Performed by: INTERNAL MEDICINE

## 2025-04-30 RX ORDER — LISINOPRIL 40 MG/1
40 TABLET ORAL DAILY
Qty: 90 TABLET | Refills: 3 | Status: SHIPPED | OUTPATIENT
Start: 2025-04-30

## 2025-04-30 RX ORDER — EZETIMIBE 10 MG/1
10 TABLET ORAL DAILY
Qty: 90 TABLET | Refills: 3 | Status: SHIPPED | OUTPATIENT
Start: 2025-04-30

## 2025-04-30 RX ORDER — PREDNISOLONE ACETATE 10 MG/ML
1 SUSPENSION/ DROPS OPHTHALMIC DAILY
COMMUNITY

## 2025-04-30 RX ORDER — FLUOXETINE HCL 20 MG
20 CAPSULE ORAL DAILY
Qty: 90 CAPSULE | Refills: 3 | Status: SHIPPED | OUTPATIENT
Start: 2025-04-30 | End: 2026-04-30

## 2025-04-30 RX ORDER — FLUOXETINE HYDROCHLORIDE 20 MG/1
20 CAPSULE ORAL DAILY
Qty: 90 CAPSULE | Refills: 3 | Status: SHIPPED | OUTPATIENT
Start: 2025-04-30 | End: 2025-04-30 | Stop reason: WASHOUT

## 2025-04-30 ASSESSMENT — ENCOUNTER SYMPTOMS
SHORTNESS OF BREATH: 0
LIGHT-HEADEDNESS: 0
HEADACHES: 0
DYSURIA: 0
FATIGUE: 0
DYSPHORIC MOOD: 0
JOINT SWELLING: 0
WEAKNESS: 0
FEVER: 0
FATIGUE: 0
CONFUSION: 0
PSYCHIATRIC NEGATIVE: 1
COUGH: 0
COUGH: 0
NUMBNESS: 0
BACK PAIN: 0
CHILLS: 0
ABDOMINAL PAIN: 0
DIZZINESS: 0
PALPITATIONS: 0
DIARRHEA: 0
ARTHRALGIAS: 1
ENDOCRINE NEGATIVE: 1
VOMITING: 0
COLOR CHANGE: 0
HEMATURIA: 0
NAUSEA: 0
SHORTNESS OF BREATH: 0
MYALGIAS: 0
CONSTIPATION: 0
FEVER: 0
GASTROINTESTINAL NEGATIVE: 1

## 2025-04-30 ASSESSMENT — PATIENT HEALTH QUESTIONNAIRE - PHQ9
1. LITTLE INTEREST OR PLEASURE IN DOING THINGS: NOT AT ALL
SUM OF ALL RESPONSES TO PHQ9 QUESTIONS 1 AND 2: 0
2. FEELING DOWN, DEPRESSED OR HOPELESS: NOT AT ALL
1. LITTLE INTEREST OR PLEASURE IN DOING THINGS: NOT AT ALL
2. FEELING DOWN, DEPRESSED OR HOPELESS: NOT AT ALL
SUM OF ALL RESPONSES TO PHQ9 QUESTIONS 1 AND 2: 0

## 2025-04-30 ASSESSMENT — ACTIVITIES OF DAILY LIVING (ADL)
DOING_HOUSEWORK: INDEPENDENT
TAKING_MEDICATION: INDEPENDENT
BATHING: INDEPENDENT
DRESSING: INDEPENDENT
MANAGING_FINANCES: INDEPENDENT
GROCERY_SHOPPING: INDEPENDENT

## 2025-04-30 NOTE — PROGRESS NOTES
CHIEF COMPLAINT  Medicare Annual Wellness Visit Subsequent    HISTORY OF PRESENT ILLNESS  Amelie Burris is a 75 y.o. female presents today for follow up of Medicare Annual Wellness Visit Subsequent    HPI    Past Medical, Surgical, and Family History reviewed and updated in chart.  Reviewed all medications by prescribing practitioner or clinical pharmacist (such as prescriptions, OTCs, herbal therapies and supplements) and documented in the medical record.    Eye surgeries since last visit, updated in chart (cornea transplant & cataract extraction of both eyes).  Typically blood pressure is well controlled.  Due for lipid panel.    REVIEW OF SYSTEMS  Review of Systems   Constitutional:  Negative for chills, fatigue and fever.   Respiratory:  Negative for cough and shortness of breath.    Cardiovascular:  Negative for chest pain, palpitations and leg swelling.   Gastrointestinal:  Negative for abdominal pain, constipation, diarrhea, nausea and vomiting.   Genitourinary:  Negative for dysuria and hematuria.   Skin:  Negative for rash.   Neurological:  Negative for dizziness, light-headedness and headaches.   Psychiatric/Behavioral:  Negative for confusion and dysphoric mood.                            ALLERGIES  Amoxicillin    MEDICATIONS  Current Outpatient Medications   Medication Instructions    cholecalciferol (Vitamin D-3) 50 MCG (2000 UT) tablet Take by mouth.    ezetimibe (ZETIA) 10 mg, oral, Daily    lisinopril 40 mg, oral, Daily    Miebo 100 % drops 2 drops, Daily    prednisoLONE acetate (Pred-Forte) 1 % ophthalmic suspension 1 drop, Daily    PROzac 20 mg, oral, Daily, Patient needs brand name Prozac.       TOBACCO USE  Tobacco Use History[1]    DEPRESSION SCREEN  Over the past 2 weeks, how often have you been bothered by any of the following problems?  Little interest or pleasure in doing things: Not at all  Feeling down, depressed, or hopeless: Not at all    SURGICAL HISTORY  Past Surgical  "History:  10/24/2014: BREAST SURGERY      Comment:  Breast Surgery Enlargement Procedure  10/24/2014: BREAST SURGERY      Comment:  Breast Surgery Removal Of Mammary Implant Bilateral  10/24/2014: CHOLECYSTECTOMY      Comment:  Cholecystectomy  10/24/2014: COLONOSCOPY      Comment:  Complete Colonoscopy  07/2024: CORNEAL TRANSPLANT; Left      Comment:  cornea transplant and cataract extraction  01/2025: CORNEAL TRANSPLANT; Right      Comment:  cornea transplant and cataract extraction  10/24/2014: DILATION AND CURETTAGE OF UTERUS      Comment:  Dilation And Curettage  No date: ESOPHAGOGASTRODUODENOSCOPY  10/24/2014: LIPOSUCTION      Comment:  Liposuction  10/24/2014: MASTECTOMY, RADICAL      Comment:  Modified Radical Mastectomy Bilateral  10/24/2014: SINUS SURGERY      Comment:  Sinus Surgery  12/21/2017: SINUS SURGERY      Comment:  Sinus Surgery  10/24/2014: TUBAL LIGATION      Comment:  Tubal Ligation       OBJECTIVE    BP (!) 188/72   Pulse 61   Temp 36 °C (96.8 °F)   Ht 1.676 m (5' 6\")   Wt 74.1 kg (163 lb 6.4 oz)   SpO2 96%   BMI 26.37 kg/m²    BMI: Estimated body mass index is 26.37 kg/m² as calculated from the following:    Height as of this encounter: 1.676 m (5' 6\").    Weight as of this encounter: 74.1 kg (163 lb 6.4 oz).    BP Readings from Last 3 Encounters:   04/30/25 (!) 188/72   04/30/25 174/82   10/30/24 144/86      Wt Readings from Last 3 Encounters:   04/30/25 74.1 kg (163 lb 6.4 oz)   04/30/25 74.3 kg (163 lb 14.4 oz)   10/30/24 73 kg (161 lb)        PHYSICAL EXAM  Physical Exam  Constitutional:       Appearance: Normal appearance.   HENT:      Head: Normocephalic and atraumatic.      Right Ear: Tympanic membrane and ear canal normal.      Left Ear: Tympanic membrane and ear canal normal.   Neck:      Vascular: No carotid bruit.   Cardiovascular:      Rate and Rhythm: Normal rate and regular rhythm.      Pulses: Normal pulses.      Heart sounds: No murmur heard.  Pulmonary:      Effort: " Pulmonary effort is normal. No respiratory distress.      Breath sounds: Normal breath sounds. No wheezing.   Abdominal:      General: There is no distension.      Palpations: Abdomen is soft.      Tenderness: There is no abdominal tenderness.   Musculoskeletal:      Right lower leg: No edema.      Left lower leg: No edema.   Skin:     Findings: No rash.   Neurological:      General: No focal deficit present.      Mental Status: She is alert and oriented to person, place, and time. Mental status is at baseline.   Psychiatric:         Mood and Affect: Mood normal.         Behavior: Behavior normal.         Thought Content: Thought content normal.         Judgment: Judgment normal.          ASSESSMENT AND PLAN  Assessment/Plan   Problem List Items Addressed This Visit       Hyperlipidemia, mixed    Relevant Medications    ezetimibe (Zetia) 10 mg tablet    Other Relevant Orders    Lipid Panel    Benign essential hypertension    Relevant Medications    lisinopril 40 mg tablet    Recurrent major depression in full remission (CMS-[facility])    Relevant Medications    PROzac 20 mg capsule    History of bilateral mastectomy    Medicare annual wellness visit, subsequent - Primary    Screening for multiple conditions    Overview   5 minutes were spent screening for depression using PHQ2/PHQ9 as documented in the chart.              Medicare Wellness Visit completed.      Hypertension - high today, patient states it's normally well controlled.  - advised to monitor at home, provided with log sheet.  Contact me if > 140/90.  - Continue current medication and healthy habits as able.     Hyperlipidemia - continue Zetia.  Check FLP.    Depression - controlled with Prozac.  Brand name works better for her than generic, Rx sent.      Mammogram no longer indicated (s/p bilateral mastectomy).      Colon cancer screening declined.     Reviewed signs of skin cancer and importance of sun protection.     Stay up to date with dental exams.  Eye  exams are up to date, follows closely with ophthalmologist given history.     Immunizations declined.     Follow-up 1 year.                [1]   Social History  Tobacco Use   Smoking Status Never   Smokeless Tobacco Never

## 2025-04-30 NOTE — PROGRESS NOTES
[facility] RHEUMATOLOGY     AND INTERNAL MEDICINE    RHEUMATOLOGYPROGRESS NOTE    Amelie Burris 75 y.o. 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    Notes achiness in joints that vary day by day.  Not a lot of swelling  Not getting a lot of sleep (selling home, current home has 4 train tracks nearby that affect her ability to sleep)      Records since last seen reviewed in [facility], [facility] and [facility] Record  Patient Active Problem List    Diagnosis Date Noted    Screening for multiple conditions 2025    History of corneal transplant 2025    Meibomian gland dysfunction (MGD) of upper and lower lids of both eyes 2024    Ptosis of both eyelids 2024    Degenerative tear of acetabular labrum 2024    Medicare annual wellness visit, subsequent 2024    History of bilateral mastectomy 10/23/2023    Chronic diarrhea 10/23/2023    Chondral defect of acetabulum 10/11/2023    Chronic simple rhinitis 10/11/2023    Fibromyalgia 10/11/2023    GERD (gastroesophageal reflux disease) 10/11/2023    Hyperlipidemia, mixed 10/11/2023    Benign essential hypertension 10/11/2023    Primary insomnia 10/11/2023    Recurrent major depression in full remission (CMS-HCC) 10/11/2023    Sjogren's syndrome with inflammatory arthritis 10/11/2023    Senile nuclear cataract, left 2018    Fuchs' corneal dystrophy 2012     Medical History[1]  Medications Ordered Prior to Encounter[2]  RX Allergies[3]  Social History[4]  Review of Systems   Constitutional:  Negative for fatigue and fever.   HENT: Negative.     Eyes:  Negative for visual disturbance.        Dry eye   Respiratory:  Negative for cough and shortness of breath.    Cardiovascular:  Negative for leg swelling.   Gastrointestinal: Negative.   "  Endocrine: Negative.    Genitourinary: Negative.    Musculoskeletal:  Positive for arthralgias. Negative for back pain, gait problem, joint swelling and myalgias.   Skin:  Negative for color change and rash.   Neurological:  Negative for weakness and numbness.   Psychiatric/Behavioral: Negative.         PHYSICAL EXAM  /82   Pulse 61   Temp 36 °C (96.8 °F)   Ht 1.676 m (5' 6\")   Wt 74.3 kg (163 lb 14.4 oz)   SpO2 96%   BMI 26.45 kg/m²   Depression: Not at risk (4/30/2025)    PHQ-2     PHQ-2 Score: 0     Physical Exam  Vitals reviewed.   Constitutional:       General: She is not in acute distress.     Appearance: Normal appearance.   HENT:      Head: Normocephalic and atraumatic.   Eyes:      General: No scleral icterus.     Extraocular Movements: Extraocular movements intact.      Conjunctiva/sclera: Conjunctivae normal.      Pupils: Pupils are equal, round, and reactive to light.      Comments: Mild ptosis   Pulmonary:      Effort: Pulmonary effort is normal. No respiratory distress.   Musculoskeletal:         General: No swelling, tenderness or deformity.      Cervical back: Normal range of motion and neck supple. No tenderness.      Right lower leg: No edema.      Left lower leg: No edema.      Comments: No synovitis of examined joints  OA changes in hands   Skin:     General: Skin is dry.      Coloration: Skin is not pale.      Findings: Erythema present. No rash.   Neurological:      General: No focal deficit present.      Mental Status: She is alert and oriented to person, place, and time. Mental status is at baseline.      Gait: Gait normal.   Psychiatric:         Mood and Affect: Mood normal.           Health Maintenance Due   Topic Date Due    Yearly Adult Physical  Never done    Bone Density Scan  Never done    Colorectal Cancer Screening  Never done    Hepatitis C Screening  Never done    DTaP/Tdap/Td Vaccines (1 - Tdap) Never done    Pneumococcal Vaccine (1 of 1 - PCV) Never done    Zoster " Vaccines (1 of 2) Never done    Diabetes Screening  04/29/2022    COVID-19 Vaccine (3 - 2024-25 season) 09/01/2024    RSV High Risk: (Elderly (60+) or Pregnant Population) (1 - 1-dose 75+ series) Never done       Assessment/plan  Assessment & Plan  Sjogren's syndrome with inflammatory arthritis  Largely stable and most arthritic issues are OA in nature.  Continue to monitor   lab next visit  Orders:    Follow Up In Rheumatology; Future    Fibromyalgia  Affected by weather.   Pt taking OTC meds     Orders:    Follow Up In Rheumatology; Future      Follow up: ___6__months, sooner if change in symptoms    Immunization History   Administered Date(s) Administered    Influenza, seasonal, injectable 10/24/1996    Pfizer Purple Cap SARS-CoV-2 08/31/2021, 09/10/2021                [1]   Past Medical History:  Diagnosis Date    Acne 10/24/2014    History of acne    Breast cancer 04/13/2020    History of breast cancer    Chronic maxillary sinusitis 11/09/2017    Right maxillary sinusitis    Dermatitis of ear canal 04/25/2024    Drug rash 08/27/2015    History of drug rash    Dry mouth, unspecified 06/29/2015    Dry mouth    Facial dermatitis 10/11/2017    Facial dermatitis    Fuchs' corneal dystrophy 10/24/2014    Fuchs' corneal dystrophy    Medicare annual wellness visit, subsequent 04/25/2024    Nontoxic multinodular goiter     Multinodular goiter    Plantar fascial fibromatosis 03/24/2021    Plantar fasciitis    Sensorineural hearing loss, bilateral     Sensorineural hearing loss (SNHL) of both ears    Thyroid nodule 06/08/2016    History of thyroid nodule    Tinnitus 10/24/2014    History of tinnitus    Unspecified cataract     Cataracts, bilateral   [2]   Current Outpatient Medications on File Prior to Visit   Medication Sig Dispense Refill    cholecalciferol (Vitamin D-3) 50 MCG (2000 UT) tablet Take by mouth.      ezetimibe (Zetia) 10 mg tablet TAKE 1 TABLET DAILY 90 tablet 3    FLUoxetine (PROzac) 20 mg capsule TAKE 1  CAPSULE DAILY 90 capsule 3    lisinopril 40 mg tablet TAKE 1 TABLET DAILY 90 tablet 3    Miebo 100 % drops Administer 2 drops into both eyes once daily.      prednisoLONE acetate (Pred-Forte) 1 % ophthalmic suspension Administer 1 drop into the right eye once daily. Left eye every other day - one drop      amitriptyline (Elavil) 10 mg tablet Take 1 tablet (10 mg) by mouth once daily at bedtime. 30 tablet 2     No current facility-administered medications on file prior to visit.   [3]   Allergies  Allergen Reactions    Amoxicillin Nausea/vomiting   [4]   Social History  Tobacco Use    Smoking status: Never    Smokeless tobacco: Never   Substance Use Topics    Alcohol use: Not Currently     Comment: social    Drug use: Never

## 2025-04-30 NOTE — ASSESSMENT & PLAN NOTE
Largely stable and most arthritic issues are OA in nature.  Continue to monitor   lab next visit  Orders:    Follow Up In Rheumatology; Future

## 2025-04-30 NOTE — PATIENT INSTRUCTIONS
It was a pleasure to see you today  Please call if your symptoms worsen  Please review your summary for education and reminders.  Follow up at your next appointment.    If you had labs/xrays done today, you will be able to view on [employer].   We will contact you when the results are reviewed for further discussion.  Please note that you may receive your results before I have had a chance to review.  Please know I will be contacting you for discussion  Homegoing instructions for all patient  A healthy lifestyle helps chronic diseases  These are all the goals you should strive to improve your overall health   Blood pressure <130/85   BMI of <30 or waist circumference that is 1/2 of your height   Fasting blood sugar <107 (if you are diabetic, aim for an A1c <6.4%_   LDL cholesterol <130   Avoid smoking   Manage your stress   Get your preventive exams   Get your immunizations     Sjogren's can cause severe dry eye--make sure you see your eye doctor on a regular basis  Sjogren's can cause severe dry mouth which increases the development of dental cavities.  Follow up with your dentist regularly

## 2025-05-01 LAB
CHOLEST SERPL-MCNC: 265 MG/DL
CHOLEST/HDLC SERPL: 6.3 (CALC)
HDLC SERPL-MCNC: 42 MG/DL
LDLC SERPL CALC-MCNC: 187 MG/DL (CALC)
NONHDLC SERPL-MCNC: 223 MG/DL (CALC)
TRIGL SERPL-MCNC: 183 MG/DL

## 2025-05-11 DIAGNOSIS — F33.42 RECURRENT MAJOR DEPRESSION IN FULL REMISSION: Chronic | ICD-10-CM

## 2025-05-11 RX ORDER — FLUOXETINE 20 MG/1
20 CAPSULE ORAL DAILY
Qty: 90 CAPSULE | Refills: 3 | Status: SHIPPED | OUTPATIENT
Start: 2025-05-11 | End: 2026-05-11

## 2025-10-30 ENCOUNTER — APPOINTMENT (OUTPATIENT)
Dept: RHEUMATOLOGY | Facility: CLINIC | Age: 75
End: 2025-10-30
Payer: COMMERCIAL

## 2026-04-30 ENCOUNTER — APPOINTMENT (OUTPATIENT)
Dept: PRIMARY CARE | Facility: CLINIC | Age: 76
End: 2026-04-30
Payer: COMMERCIAL

## 2026-04-30 ENCOUNTER — APPOINTMENT (OUTPATIENT)
Dept: RHEUMATOLOGY | Facility: CLINIC | Age: 76
End: 2026-04-30
Payer: COMMERCIAL